# Patient Record
Sex: FEMALE | Race: WHITE | NOT HISPANIC OR LATINO | Employment: UNEMPLOYED | URBAN - METROPOLITAN AREA
[De-identification: names, ages, dates, MRNs, and addresses within clinical notes are randomized per-mention and may not be internally consistent; named-entity substitution may affect disease eponyms.]

---

## 2017-06-16 ENCOUNTER — GENERIC CONVERSION - ENCOUNTER (OUTPATIENT)
Dept: OTHER | Facility: OTHER | Age: 52
End: 2017-06-16

## 2017-06-19 ENCOUNTER — ALLSCRIPTS OFFICE VISIT (OUTPATIENT)
Dept: OTHER | Facility: OTHER | Age: 52
End: 2017-06-19

## 2017-06-19 DIAGNOSIS — N92.1 EXCESSIVE AND FREQUENT MENSTRUATION WITH IRREGULAR CYCLE: ICD-10-CM

## 2017-06-19 PROCEDURE — 88305 TISSUE EXAM BY PATHOLOGIST: CPT | Performed by: OBSTETRICS & GYNECOLOGY

## 2017-06-19 PROCEDURE — 88344 IMHCHEM/IMCYTCHM EA MLT ANTB: CPT | Performed by: OBSTETRICS & GYNECOLOGY

## 2017-06-21 ENCOUNTER — LAB REQUISITION (OUTPATIENT)
Dept: LAB | Facility: HOSPITAL | Age: 52
End: 2017-06-21
Payer: COMMERCIAL

## 2017-06-21 DIAGNOSIS — N84.1 POLYP OF CERVIX UTERI: ICD-10-CM

## 2017-06-23 ENCOUNTER — HOSPITAL ENCOUNTER (OUTPATIENT)
Dept: ULTRASOUND IMAGING | Facility: HOSPITAL | Age: 52
Discharge: HOME | End: 2017-06-23
Attending: OBSTETRICS & GYNECOLOGY
Payer: COMMERCIAL

## 2017-06-23 DIAGNOSIS — N92.1 EXCESSIVE AND FREQUENT MENSTRUATION WITH IRREGULAR CYCLE: ICD-10-CM

## 2017-06-23 PROCEDURE — 76830 TRANSVAGINAL US NON-OB: CPT

## 2017-06-23 PROCEDURE — 76856 US EXAM PELVIC COMPLETE: CPT

## 2017-06-26 ENCOUNTER — GENERIC CONVERSION - ENCOUNTER (OUTPATIENT)
Dept: OTHER | Facility: OTHER | Age: 52
End: 2017-06-26

## 2017-06-27 ENCOUNTER — GENERIC CONVERSION - ENCOUNTER (OUTPATIENT)
Dept: OTHER | Facility: OTHER | Age: 52
End: 2017-06-27

## 2017-09-09 ENCOUNTER — TRANSCRIBE ORDERS (OUTPATIENT)
Dept: ADMINISTRATIVE | Age: 52
End: 2017-09-09

## 2017-09-09 ENCOUNTER — APPOINTMENT (OUTPATIENT)
Dept: LAB | Age: 52
End: 2017-09-09
Attending: PREVENTIVE MEDICINE

## 2017-09-09 DIAGNOSIS — Z13.9 SCREENING FOR CONDITION: ICD-10-CM

## 2017-09-09 DIAGNOSIS — Z13.9 SCREENING FOR CONDITION: Primary | ICD-10-CM

## 2017-09-09 LAB
CHOLEST SERPL-MCNC: 132 MG/DL (ref 50–200)
EST. AVERAGE GLUCOSE BLD GHB EST-MCNC: 117 MG/DL
HBA1C MFR BLD: 5.7 % (ref 4.2–6.3)
HDLC SERPL-MCNC: 70 MG/DL (ref 40–60)
LDLC SERPL CALC-MCNC: 54 MG/DL (ref 0–100)
TRIGL SERPL-MCNC: 40 MG/DL

## 2017-09-09 PROCEDURE — 80061 LIPID PANEL: CPT

## 2017-09-09 PROCEDURE — 83036 HEMOGLOBIN GLYCOSYLATED A1C: CPT

## 2017-09-09 PROCEDURE — 36415 COLL VENOUS BLD VENIPUNCTURE: CPT

## 2018-01-11 NOTE — MISCELLANEOUS
Message   Recorded as Task   Date: 10/17/2016 02:23 PM, Created By: Kelvin Spurling   Task Name: Call Back   Assigned To: Lalo Edgar   Regarding Patient: Floresita Cardona, Status: In Progress   CommentRoegillian Naranjo - 17 Oct 2016 2:23 PM     TASK CREATED  had emb today with joe  now with clotting  [address] Oct 2016 2:38 PM     TASK IN PROGRESS   Stefano Morales - 17 Oct 2016 2:42 PM     TASK EDITED       pt had EB done today was unsure what to expect went over how some bleeding can be normal  to call office if going through pad an hour, pain  advised bleeding can be expected however should taper off  Active Problems    1  Breast lump (611 72) (N63)   2  Cervical polyp (622 7) (N84 1)   3  Encounter for gynecological examination with abnormal finding (V72 31) (Z01 411)   4  Encounter for routine gynecological examination (V72 31) (Z01 419)   5  Encounter for screening mammogram for malignant neoplasm of breast (V76 12)   (Z12 31)   6  Mammogram abnormal (793 80) (R92 8)   7  Menorrhagia with irregular cycle (626 2) (N92 1)   8  Screening for HPV (human papillomavirus) (V73 81) (Z11 51)    Current Meds   1  Citalopram Hydrobromide 20 MG Oral Tablet; Therapy: 80NQU8555 to (Last Rx:17Nov2011)  Requested for: 66YBQ8083 Ordered   2  Citalopram Hydrobromide 40 MG Oral Tablet; Therapy: 24SRS7535 to (Last Rx:26Gfl3123)  Requested for: 48Xww7171 Ordered    Allergies    1   No Known Drug Allergies    Signatures   Electronically signed by : Mickie Fuentes LPN; Oct 17 2419  3:89VT EST                       (Author)

## 2018-01-14 VITALS
DIASTOLIC BLOOD PRESSURE: 76 MMHG | BODY MASS INDEX: 23.07 KG/M2 | SYSTOLIC BLOOD PRESSURE: 122 MMHG | WEIGHT: 147 LBS | HEIGHT: 67 IN

## 2018-01-14 NOTE — MISCELLANEOUS
Message   Recorded as Task   Date: 06/16/2017 09:32 AM, Created By: Nikolay Polo   Task Name: Call Back   Assigned To: Calvin Mcdaniels   Regarding Patient: Anna Purdy, Status: In Progress   Comment:    Jacklyn Allan - 16 Jun 2017 9:32 AM     TASK CREATED  PT CALLED STATING THAT SHE HAS BEEN BLEEDING FOR 2 WKS  [address] 9:32 AM     TASK IN PROGRESS   Sarah Couch - 16 Jun 2017 9:45 AM     TASK EDITED  Spoke with pt - Patient has been bleeding for 2 weeks - 1st week it started normal then got light, like it was going to stop, but it didn't  Week 2 bleeding has been heavy for 3 days now is normal to light flow  Not having any cramping for discomfort just slight bloating  Pt goes through 5-6 tampons a day  Patient had normal periods that last for 5 days - last one prior to this bleeding was 2 1/2 weeks  Is worried  Really wanted an apt - and w/ KTM  Made one for 06/19 in [facility]  Active Problems    1  Breast lump (611 72) (N63)   2  Cervical polyp (622 7) (N84 1)   3  Encounter for gynecological examination with abnormal finding (V72 31) (Z01 411)   4  Encounter for routine gynecological examination (V72 31) (Z01 419)   5  Encounter for screening mammogram for malignant neoplasm of breast (V76 12)   (Z12 31)   6  Mammogram abnormal (793 80) (R92 8)   7  Menorrhagia with irregular cycle (626 2) (N92 1)   8  Screening for HPV (human papillomavirus) (V73 81) (Z11 51)    Current Meds   1  Citalopram Hydrobromide 20 MG Oral Tablet; Therapy: 91GKP2283 to (Last Rx:64Vzi5147)  Requested for: 59SDA4589 Ordered   2  Citalopram Hydrobromide 40 MG Oral Tablet; Therapy: 95VMA8008 to (Last Rx:42Amj3814)  Requested for: 08Abj9005 Ordered    Allergies    1   No Known Drug Allergies    Signatures   Electronically signed by : Debora Villarreal, ; Jun 16 2017  9:45AM EST                       (Author)

## 2018-01-15 NOTE — MISCELLANEOUS
Message   Recorded as Task   Date: 06/23/2017 06:16 PM, Created By: Renetta Lam   Task Name: Go to Result   Assigned To: Elmer Lynne   Regarding Patient: Yong Epperson, Status: In Progress   Alfredtoi Waqas - 23 Jun 2017 6:16 PM     TASK CREATED  please call Al Bravo- her ultrasound looks normal   There is disruption of the lower uterine segment (I had removed a polyp in the office that day, may be just s/p removal)    if no further abnormal bleeding- no further workup needed  If bleeding continues will need D&C     still waiting on pathology    Kash Pruett - 26 Jun 2017 8:23 AM     TASK EDITED  Bouchra Pruett - 26 Jun 2017 8:29 AM     TASK IN PROGRESS   Kash Pruett - 26 Jun 2017 9:02 AM     TASK EDITED  Pt aware  Active Problems    1  Breast lump (611 72) (N63)   2  Cervical polyp (622 7) (N84 1)   3  Encounter for gynecological examination with abnormal finding (V72 31) (Z01 411)   4  Encounter for routine gynecological examination (V72 31) (Z01 419)   5  Encounter for screening mammogram for malignant neoplasm of breast (V76 12)   (Z12 31)   6  Mammogram abnormal (793 80) (R92 8)   7  Menorrhagia with regular cycle (626 2) (N92 0)   8  Screening for HPV (human papillomavirus) (V73 81) (Z11 51)    Current Meds   1  Citalopram Hydrobromide 20 MG Oral Tablet; Therapy: 28MOK4374 to (Last Rx:17Nov2011)  Requested for: 19XLW5882 Ordered    Allergies    1  No Known Drug Allergies    Signatures   Electronically signed by :  Natalia Campos, ; Jun 26 2017  9:02AM EST                       (Author)

## 2018-01-15 NOTE — RESULT NOTES
Message   Recorded as Task   Date: 10/18/2016 07:40 PM, Created By: Mamadou Glass   Task Name: Verify Patient Results   Assigned To: Mari Mirza   Regarding Patient: Yong Epperson, Status: In Progress   CommentVirgia Crow - 18 Oct 2016 7:40 PM        Mari Mirza - 19 Oct 2016 1:51 PM     TASK EDITED  Called patient left a message on her voicemail     Mari Mirza - 19 Oct 2016 1:51 PM     TASK IN PROGRESS   Mari Mirza - 19 Oct 2016 1:57 PM     TASK EDITED  Patient called back and discuss the pathology results for the polyp        Signatures   Electronically signed by : Margaret Mathew CNM; Oct 19 2016  1:57PM EST                       (Author)

## 2018-01-16 NOTE — RESULT NOTES
Message   Recorded as Task   Date: 10/25/2016 03:13 PM, Created By: Norah Ward   Task Name: Verify Patient Results   Assigned To: Mari Mirza   Regarding Patient: Kelin Stokes, Status: In Progress   CommentGladizay Good - 25 Oct 2016 3:13 PM        Mari Mirza - 25 Oct 2016 3:47 PM     TASK EDITED  Called patient left a message on her voicemail to call me back  Mari Mirza - 25 Oct 2016 3:47 PM     TASK IN PROGRESS   Mari Mirza - 26 Oct 2016 11:23 AM     TASK EDITED  Jeffne Jose A called back and informed her that the endometrial biopsy was benign        Signatures   Electronically signed by : Kike Ramirez CNM;  Oct 26 2016 11:24AM EST                       (Author)

## 2018-01-17 NOTE — MISCELLANEOUS
Message   Recorded as Task   Date: 06/27/2017 11:53 AM, Created By: Kelvin Powers   Task Name: Go to Result   Assigned To: Flaco Stapleton   Regarding Patient: Jorden Lynn, Status: In Progress   Mary Hickey - 27 Jun 2017 11:53 AM     TASK CREATED  please call Starr Hubbard- polyp is benign   Rubia Marie - 27 Jun 2017 1:24 PM     TASK IN PROGRESS   Rubia Marie - 27 Jun 2017 1:26 PM     TASK EDITED  lm for pt re benign polyp        Active Problems    1  Breast lump (611 72) (N63)   2  Cervical polyp (622 7) (N84 1)   3  Encounter for gynecological examination with abnormal finding (V72 31) (Z01 411)   4  Encounter for routine gynecological examination (V72 31) (Z01 419)   5  Encounter for screening mammogram for malignant neoplasm of breast (V76 12)   (Z12 31)   6  Mammogram abnormal (793 80) (R92 8)   7  Menorrhagia with regular cycle (626 2) (N92 0)   8  Screening for HPV (human papillomavirus) (V73 81) (Z11 51)    Current Meds   1  Citalopram Hydrobromide 20 MG Oral Tablet; Therapy: 97XAL3281 to (Last Rx:17Nov2011)  Requested for: 40APX5989 Ordered    Allergies    1   No Known Drug Allergies    Signatures   Electronically signed by : Nick Suaer, ; Jun 27 2017  1:26PM EST                       (Author)

## 2018-04-13 PROBLEM — N92.0 MENORRHAGIA WITH REGULAR CYCLE: Status: ACTIVE | Noted: 2017-06-19

## 2018-05-26 ENCOUNTER — TRANSCRIBE ORDERS (OUTPATIENT)
Dept: ADMINISTRATIVE | Age: 53
End: 2018-05-26

## 2018-05-26 ENCOUNTER — APPOINTMENT (OUTPATIENT)
Dept: LAB | Age: 53
End: 2018-05-26
Attending: PREVENTIVE MEDICINE

## 2018-05-26 DIAGNOSIS — Z13.9 SCREENING PROCEDURE: Primary | ICD-10-CM

## 2018-05-26 DIAGNOSIS — Z13.9 SCREENING PROCEDURE: ICD-10-CM

## 2018-05-26 LAB
CHOLEST SERPL-MCNC: 126 MG/DL (ref 50–200)
EST. AVERAGE GLUCOSE BLD GHB EST-MCNC: 114 MG/DL
HBA1C MFR BLD: 5.6 % (ref 4.2–6.3)
HDLC SERPL-MCNC: 72 MG/DL (ref 40–60)
LDLC SERPL CALC-MCNC: 47 MG/DL (ref 0–100)
NONHDLC SERPL-MCNC: 54 MG/DL
TRIGL SERPL-MCNC: 33 MG/DL

## 2018-05-26 PROCEDURE — 83036 HEMOGLOBIN GLYCOSYLATED A1C: CPT

## 2018-05-26 PROCEDURE — 36415 COLL VENOUS BLD VENIPUNCTURE: CPT

## 2018-05-26 PROCEDURE — 80061 LIPID PANEL: CPT

## 2018-10-16 ENCOUNTER — ANNUAL EXAM (OUTPATIENT)
Dept: OBGYN CLINIC | Age: 53
End: 2018-10-16
Payer: COMMERCIAL

## 2018-10-16 VITALS
SYSTOLIC BLOOD PRESSURE: 110 MMHG | WEIGHT: 142 LBS | BODY MASS INDEX: 22.29 KG/M2 | DIASTOLIC BLOOD PRESSURE: 62 MMHG | HEIGHT: 67 IN

## 2018-10-16 DIAGNOSIS — N95.1 PERIMENOPAUSAL: ICD-10-CM

## 2018-10-16 DIAGNOSIS — Z12.31 ENCOUNTER FOR SCREENING MAMMOGRAM FOR MALIGNANT NEOPLASM OF BREAST: ICD-10-CM

## 2018-10-16 DIAGNOSIS — Z01.419 ENCOUNTER FOR GYNECOLOGICAL EXAMINATION WITHOUT ABNORMAL FINDING: Primary | ICD-10-CM

## 2018-10-16 PROCEDURE — G0145 SCR C/V CYTO,THINLAYER,RESCR: HCPCS | Performed by: NURSE PRACTITIONER

## 2018-10-16 PROCEDURE — 87624 HPV HI-RISK TYP POOLED RSLT: CPT | Performed by: NURSE PRACTITIONER

## 2018-10-16 PROCEDURE — S0612 ANNUAL GYNECOLOGICAL EXAMINA: HCPCS | Performed by: NURSE PRACTITIONER

## 2018-10-16 RX ORDER — CITALOPRAM 20 MG/1
20 TABLET ORAL
COMMUNITY
Start: 2018-09-05

## 2018-10-16 NOTE — PATIENT INSTRUCTIONS
Menopause   WHAT YOU NEED TO KNOW:   What is menopause? Menopause is a normal stage in a woman's life when her monthly periods stop  Menopause starts when the ovaries slowly stop making the female hormones estrogen and progesterone  After menopause, a woman is no longer able to become pregnant  A woman who has not had a period for a full year after the age of 39 is considered to be in menopause  Perimenopause is a stage before menopause that may cause signs and symptoms similar to menopause  Perimenopause can last an average of 4 to 5 years  What are the signs and symptoms of menopause? The signs and symptoms of menopause can be different from woman to woman:  · Menstrual period changes such as skipped periods or periods that are closer together, or lighter or heavier than usual    · Hot flashes (feeling warm, flushed, and sweaty)     · Mood changes such as irritability or decreased desire to have sex    · Breast changes such as tenderness or pain    · Hair changes such as thinning hair or increased hair on your face    · Vaginal changes such as increased dryness     · Urinary changes such as increased urinary tract infections (UTIs) or urgency (feeling that you need to urinate right away)    · Other symptoms such as headaches, trouble sleeping, fatigue, or heart palpitations (strong, fast heartbeats)  What do I need to know about menopause? · You can still get pregnant while you have periods  Continue to use birth control if you do not want to get pregnant  You may need to use birth control until it has been 1 year since your periods stopped  Ask your healthcare provider when you can stop using birth control to prevent pregnancy  · Hormone replacement therapy can be used to treat symptoms of menopause  Hormone replacement therapy (HRT) is medicine that replaces your low hormone levels  HRT contains estrogen and sometimes progestin  HRT has benefits and risks   HRT decreases your risk for bone fractures by helping to prevent osteoporosis  HRT also protects you from colon cancer  HRT may increase your risk for breast cancer, blood clots, heart disease, and stroke  Ask your healthcare provider if HRT is right for you  How can I live a healthy lifestyle during and after menopause? After menopause, your risk for heart disease and bone loss increases  Ask about these and other ways to stay healthy:  · Exercise regularly  Exercise helps you maintain a healthy weight  Exercise can also help to control your blood pressure and cholesterol levels  Include weight-bearing exercise for strong bones  Ask your healthcare provider about the best exercise plan for you  · Eat a variety of healthy foods  Include fruits, vegetables, whole grains (whole-wheat bread, pasta, and cereals), low-fat dairy, and lean protein foods (beans, poultry, and fish)  Limit foods high in sodium (salt)  Ask your healthcare provider for more information about a meal plan that is right for you  · Maintain a healthy weight  Check with your healthcare provider before you start any weight loss program      · Take supplements as directed  You may need extra calcium and vitamin D to help prevent osteoporosis  · Limit alcohol and caffeine  Alcohol and caffeine may worsen your symptoms  · Do not smoke  If you smoke, it is never too late to quit  You are more likely to have a heart attack, lung disease, blood clots, and cancer if you smoke  Ask your healthcare provider for information if you need help quitting  When should I contact my healthcare provider? · You have vaginal bleeding after menopause  · You have questions or concerns about your condition or care  CARE AGREEMENT:   You have the right to help plan your care  Learn about your health condition and how it may be treated  Discuss treatment options with your caregivers to decide what care you want to receive  You always have the right to refuse treatment   The above information is an  only  It is not intended as medical advice for individual conditions or treatments  Talk to your doctor, nurse or pharmacist before following any medical regimen to see if it is safe and effective for you  © 2017 [address] [employer] Information is for End User's use only and may not be sold, redistributed or otherwise used for commercial purposes  All illustrations and images included in CareNotes® are the copyrighted property of A D A M [employer] Inc  or Jacobo Calero

## 2018-10-16 NOTE — PROGRESS NOTES
Assessment/Plan:    No problem-specific Assessment & Plan notes found for this encounter  Diagnoses and all orders for this visit:    Encounter for gynecological examination without abnormal finding  -     Liquid-based pap, screening    Perimenopausal  -     US pelvis complete non OB; Future    Encounter for screening mammogram for malignant neoplasm of breast  -     Mammo screening bilateral w 3d & cad; Future    Other orders  -     citalopram (CeleXA) 20 mg tablet;       Call as needed, encouraged calcium/vit D in her diet, all questions answered      Subjective:      Patient ID: Itzel Raza is a 52 y o  female  Pleasant 52 y o  premenopausal female here for annual exam  She denies any issues with menstrual/vaginal bleeding but still has occasional spotting  EMB done 2016 and normal  Pelvic u/s done 2017  + history of abnormal pap smears 2004, last Pap NIL 2015, pap today  Denies vaginal issues  Denies pelvic pain  Colonoscopy UTD  The following portions of the patient's history were reviewed and updated as appropriate:   She  has a past medical history of No known health problems  She   Patient Active Problem List    Diagnosis Date Noted    Perimenopausal 10/16/2018    Encounter for gynecological examination without abnormal finding 10/16/2018    Menorrhagia with regular cycle 06/19/2017     She  has a past surgical history that includes Colposcopy (04/2004)  Her family history includes Hypertension in her father; Skin cancer in her sister  She  reports that she has been smoking  She has never used smokeless tobacco  She reports that she drinks alcohol  She reports that she does not use drugs  Current Outpatient Prescriptions   Medication Sig Dispense Refill    citalopram (CeleXA) 20 mg tablet       rOPINIRole (REQUIP) 0 25 mg tablet Take 1 tablet by mouth daily at bedtime  3     No current facility-administered medications for this visit  She has No Known Allergies    OB History  Para Term  AB Living   1 1 1     1   SAB TAB Ectopic Multiple Live Births           1      # Outcome Date GA Lbr Morgan/2nd Weight Sex Delivery Anes PTL Lv   1 Term                 2 granddaughters, live in [facility] with her daughter  Review of Systems      Objective:      /62 (BP Location: Right arm, Patient Position: Sitting)   Ht 5' 7" (1 702 m)   Wt 64 4 kg (142 lb)   LMP 2018   Breastfeeding? No   BMI 22 24 kg/m²          Physical Exam    Review of Systems   Constitutional: Negative for chills, fatigue, fever and unexpected weight change  Respiratory: Negative for shortness of breath  Gastrointestinal: Negative for anal bleeding, blood in stool, constipation and diarrhea  Genitourinary: Negative for difficulty urinating, dysuria and hematuria  Physical Exam   Constitutional: She appears well-developed and well-nourished  No distress  HENT:   Head: Normocephalic  Neck: Normal range of motion  Neck supple  Pulmonary: Effort normal   Breasts: bilateral without masses, skin changes or nipple discharge  Bilaterally soft and warm to touch  No areas of erythema or pain  Abdominal: Soft  Pelvic exam was performed with patient supine  No labial fusion  There is no rash, tenderness, lesion or injury on the right labia  There is no rash, tenderness, lesion or injury on the left labia  Uterus is not deviated, not enlarged, not fixed and not tender  Cervix exhibits no motion tenderness, no discharge and no friability  Right adnexum displays no mass, no tenderness and no fullness  Left adnexum displays no mass, no tenderness and no fullness  No erythema or tenderness in the vagina  No foreign body in the vagina  No signs of injury around the vagina  No vaginal discharge found  Lymphadenopathy:        Right: No inguinal adenopathy present  Left: No inguinal adenopathy present

## 2018-10-18 LAB
HPV HR 12 DNA CVX QL NAA+PROBE: NEGATIVE
HPV16 DNA CVX QL NAA+PROBE: NEGATIVE
HPV18 DNA CVX QL NAA+PROBE: NEGATIVE

## 2018-10-19 LAB
LAB AP GYN PRIMARY INTERPRETATION: NORMAL
Lab: NORMAL

## 2018-10-24 ENCOUNTER — HOSPITAL ENCOUNTER (OUTPATIENT)
Dept: MAMMOGRAPHY | Facility: HOSPITAL | Age: 53
Discharge: HOME | End: 2018-10-24
Payer: COMMERCIAL

## 2018-10-24 ENCOUNTER — HOSPITAL ENCOUNTER (OUTPATIENT)
Dept: ULTRASOUND IMAGING | Facility: HOSPITAL | Age: 53
Discharge: HOME | End: 2018-10-24
Payer: COMMERCIAL

## 2018-10-24 DIAGNOSIS — Z12.31 ENCOUNTER FOR SCREENING MAMMOGRAM FOR MALIGNANT NEOPLASM OF BREAST: ICD-10-CM

## 2018-10-24 DIAGNOSIS — N95.1 PERIMENOPAUSAL: ICD-10-CM

## 2018-10-24 PROCEDURE — 76830 TRANSVAGINAL US NON-OB: CPT

## 2018-10-24 PROCEDURE — 77067 SCR MAMMO BI INCL CAD: CPT

## 2018-10-24 PROCEDURE — 77063 BREAST TOMOSYNTHESIS BI: CPT

## 2018-10-24 PROCEDURE — 76856 US EXAM PELVIC COMPLETE: CPT

## 2018-10-26 ENCOUNTER — TELEPHONE (OUTPATIENT)
Dept: OBGYN CLINIC | Facility: CLINIC | Age: 53
End: 2018-10-26

## 2018-10-26 NOTE — TELEPHONE ENCOUNTER
Called pt to set up consult with KTM for removal of polyp in uterus  Unfortunately I had to leave a message to call back   cv

## 2018-10-26 NOTE — TELEPHONE ENCOUNTER
Spoke with pt - she is aware of u/s results - that her polyp has gotten slight bigger and that Evelyne recommends she have a consult for it to be removed

## 2018-10-31 ENCOUNTER — OFFICE VISIT (OUTPATIENT)
Dept: OBGYN CLINIC | Age: 53
End: 2018-10-31
Payer: COMMERCIAL

## 2018-10-31 VITALS
BODY MASS INDEX: 23.53 KG/M2 | DIASTOLIC BLOOD PRESSURE: 68 MMHG | WEIGHT: 150.25 LBS | SYSTOLIC BLOOD PRESSURE: 100 MMHG

## 2018-10-31 DIAGNOSIS — N95.0 POSTMENOPAUSAL BLEEDING: ICD-10-CM

## 2018-10-31 DIAGNOSIS — N84.0 ENDOMETRIAL POLYP: Primary | ICD-10-CM

## 2018-10-31 DIAGNOSIS — Z01.818 PREOPERATIVE TESTING: ICD-10-CM

## 2018-10-31 PROBLEM — N92.0 MENORRHAGIA WITH REGULAR CYCLE: Status: RESOLVED | Noted: 2017-06-19 | Resolved: 2018-10-31

## 2018-10-31 PROBLEM — Z72.0 TOBACCO USE: Status: ACTIVE | Noted: 2018-10-31

## 2018-10-31 PROBLEM — N95.1 PERIMENOPAUSAL: Status: RESOLVED | Noted: 2018-10-16 | Resolved: 2018-10-31

## 2018-10-31 PROBLEM — G25.81 RESTLESS LEGS: Status: ACTIVE | Noted: 2018-10-31

## 2018-10-31 PROCEDURE — 99214 OFFICE O/P EST MOD 30 MIN: CPT | Performed by: OBSTETRICS & GYNECOLOGY

## 2018-10-31 RX ORDER — SODIUM CHLORIDE, SODIUM LACTATE, POTASSIUM CHLORIDE, CALCIUM CHLORIDE 600; 310; 30; 20 MG/100ML; MG/100ML; MG/100ML; MG/100ML
125 INJECTION, SOLUTION INTRAVENOUS CONTINUOUS
Status: CANCELLED | OUTPATIENT
Start: 2018-10-31

## 2018-10-31 NOTE — PROGRESS NOTES
Assessment/Plan:    Postmenopausal bleeding  Plan for [facility]  Hysteroscopy with polypectomy with myosure       Diagnoses and all orders for this visit:    Endometrial polyp  -     Case request operating room: DILATATION AND CURETTAGE (D&C) WITH HYSTEROSCOPY  NEED myosure; Standing  -     Case request operating room: DILATATION AND CURETTAGE (D&C) WITH HYSTEROSCOPY  NEED myosure    Postmenopausal bleeding  -     Case request operating room: DILATATION AND CURETTAGE (D&C) WITH HYSTEROSCOPY  NEED myosure; Standing  -     Case request operating room: DILATATION AND CURETTAGE (D&C) WITH HYSTEROSCOPY  NEED myosure    Preoperative testing  -     Type and screen; Future  -     CBC and differential; Future    Other orders  -     Diet NPO; Sips with meds; Standing  -     Height and weight upon arrival; Standing  -     Void on call to OR; Standing  -     Insert peripheral IV; Standing  -     Place sequential compression device; Standing  -     lactated ringers infusion; Infuse 125 mL/hr into a venous catheter continuous           Subjective:      Patient ID: Kem Ruby is a 52 y o  female  Patient is here for consult for polyp removal     52year old  with postmenopausal bleeding with endometrial polyp on ultrasound  Plan for [facility]  hysteroscopy polypectomy    Gynecologic Exam   The patient's primary symptoms include vaginal bleeding  The patient's pertinent negatives include no genital itching, genital lesions, genital odor, genital rash, pelvic pain or vaginal discharge  The current episode started more than 1 year ago  The problem occurs intermittently  The problem has been unchanged  Pertinent negatives include no chills, constipation, diarrhea, fever, nausea, sore throat or vomiting  The vaginal bleeding is spotting  She is sexually active   She is postmenopausal        The following portions of the patient's history were reviewed and updated as appropriate:   She  has a past medical history of No known health problems  She   Patient Active Problem List    Diagnosis Date Noted    Postmenopausal bleeding 10/31/2018    Endometrial polyp 10/31/2018    Tobacco use 10/31/2018    Restless legs 10/31/2018    Encounter for gynecological examination without abnormal finding 10/16/2018     She  has a past surgical history that includes Colposcopy (04/2004)  Her family history includes Hypertension in her father; Skin cancer in her sister  She  reports that she has been smoking  She has never used smokeless tobacco  She reports that she drinks alcohol  She reports that she does not use drugs  Current Outpatient Prescriptions   Medication Sig Dispense Refill    citalopram (CeleXA) 20 mg tablet       rOPINIRole (REQUIP) 0 25 mg tablet Take 1 tablet by mouth daily at bedtime  3     No current facility-administered medications for this visit  Current Outpatient Prescriptions on File Prior to Visit   Medication Sig    citalopram (CeleXA) 20 mg tablet     rOPINIRole (REQUIP) 0 25 mg tablet Take 1 tablet by mouth daily at bedtime     No current facility-administered medications on file prior to visit  She has No Known Allergies       Review of Systems   Constitutional: Negative for activity change, appetite change, chills, fatigue and fever  HENT: Negative for rhinorrhea, sneezing and sore throat  Eyes: Negative for visual disturbance  Respiratory: Negative for cough, shortness of breath and wheezing  Cardiovascular: Negative for chest pain, palpitations and leg swelling  Gastrointestinal: Negative for abdominal distention, constipation, diarrhea, nausea and vomiting  Genitourinary: Negative for difficulty urinating, pelvic pain and vaginal discharge  Neurological: Negative for syncope and light-headedness           Objective:      /68 (BP Location: Left arm, Patient Position: Sitting, Cuff Size: Standard)   Wt 68 2 kg (150 lb 4 oz)   BMI 23 53 kg/m²          Physical Exam   Constitutional: She is oriented to person, place, and time  She appears well-developed and well-nourished  No distress  Cardiovascular: Normal rate, regular rhythm and normal heart sounds  Exam reveals no gallop and no friction rub  No murmur heard  Pulmonary/Chest: Effort normal and breath sounds normal  No respiratory distress  Abdominal: Soft  Bowel sounds are normal  She exhibits no distension  There is no tenderness  There is no rebound and no guarding  Neurological: She is alert and oriented to person, place, and time  She has normal reflexes  Skin: She is not diaphoretic

## 2018-10-31 NOTE — PATIENT INSTRUCTIONS
Dilation and Curettage   WHAT YOU NEED TO KNOW:   What do I need to know about dilation and curettage? Dilation and curettage (D&C) is a procedure to remove tissue from the lining of your uterus  How do I prepare for a D&C? · Your healthcare provider will talk to you about how to prepare for your D&C  He may tell you not to eat or drink anything after midnight on the day of your procedure  He will tell you what medicines to take or not take on the day of your procedure  You may need blood tests before and after your D&C  You may need immune globulin medicine if your blood is Rh-negative, and you had a miscarriage  This medicine helps prevent problems with a future pregnancy  · You may need a vaginal swab test before your D&C to check for infection  Healthcare providers may place medicine in your cervix to help it dilate  This medicine may be inserted 1 to 2 days before your procedure or the day of your procedure  You may be given an antibiotic through your IV to help prevent a bacterial infection  What will happen during a D&C? · You may be given local or epidural anesthesia to numb your procedure area and decrease discomfort  With local or epidural anesthesia, you will be awake during your procedure  Medicine may be given in your IV to help you relax or make you drowsy  You may instead be given general anesthesia to keep you asleep during the procedure  You will lie on your back with your feet in stirrups  A tool called a speculum will be inserted into your vagina to keep it open  Your healthcare provider will check your cervix to see if it is dilated  If needed, he may use tools to dilate your cervix  · Your healthcare provider may use a tool or suction to remove tissue from your uterus  To use suction, your healthcare provider will insert a thin tube into your uterus  The tube is connected to a suction machine or a syringe  The tissue will be removed through the tube   Forceps may be needed to remove larger amounts of tissue  Tissue may be sent to a lab for tests  Medicines may be given to help your uterus tighten, and prevent heavy bleeding  What will happen after a D&C? You will be taken to a recovery room so healthcare providers can watch for heavy bleeding or other problems  You may be able to go home a few hours after your D&C  Have someone drive you home  You may have cramps and spotting or light bleeding for a few days  If you had general anesthesia, have someone stay with you for 24 hours  This is to make sure you do not have a reaction to the anesthesia  It may take a few months for your monthly period to start or become regular  What are the risks of a D&C? · If you are awake during your D&C, you may have pain while the tissue is removed  Your uterus, cervix, intestines, or nearby tissue may be torn or damaged  You may have life-threatening blood loss, and need a blood transfusion or another surgery  You may need to have your uterus removed  You may get an infection in your uterus that could spread to your blood and become life-threatening  You may have an allergic reaction to the anesthesia or antibiotics  · After your D&C, you may have nausea, vomiting, dizziness, or a headache  Scar tissue may form in your uterus  You may need another D&C to remove more tissue from your uterus  If cancer caused your abnormal vaginal bleeding, lab tests may not find the cancer in your uterus  If your D&C was done to end a pregnancy, you have an increased risk for a future miscarriage  You are also at risk for  delivery during a future pregnancy  CARE AGREEMENT:   You have the right to help plan your care  Learn about your health condition and how it may be treated  Discuss treatment options with your caregivers to decide what care you want to receive  You always have the right to refuse treatment  The above information is an  only   It is not intended as medical advice for individual conditions or treatments  Talk to your doctor, nurse or pharmacist before following any medical regimen to see if it is safe and effective for you  © 2017 [employer] Information is for End User's use only and may not be sold, redistributed or otherwise used for commercial purposes  All illustrations and images included in CareNotes® are the copyrighted property of [employer]  or Jacobo Calero

## 2018-11-13 ENCOUNTER — LAB REQUISITION (OUTPATIENT)
Dept: LAB | Facility: HOSPITAL | Age: 53
End: 2018-11-13
Payer: COMMERCIAL

## 2018-11-13 ENCOUNTER — TRANSCRIBE ORDERS (OUTPATIENT)
Dept: LAB | Facility: CLINIC | Age: 53
End: 2018-11-13

## 2018-11-13 ENCOUNTER — APPOINTMENT (OUTPATIENT)
Dept: LAB | Facility: CLINIC | Age: 53
End: 2018-11-13
Payer: COMMERCIAL

## 2018-11-13 DIAGNOSIS — Z01.818 PREOP EXAMINATION: Primary | ICD-10-CM

## 2018-11-13 DIAGNOSIS — Z01.818 ENCOUNTER FOR OTHER PREPROCEDURAL EXAMINATION: ICD-10-CM

## 2018-11-13 DIAGNOSIS — Z01.818 PREOPERATIVE TESTING: ICD-10-CM

## 2018-11-13 DIAGNOSIS — Z01.818 PREOP EXAMINATION: ICD-10-CM

## 2018-11-13 LAB
ABO GROUP BLD: NORMAL
BASOPHILS # BLD AUTO: 0.08 THOUSANDS/ΜL (ref 0–0.1)
BASOPHILS NFR BLD AUTO: 1 % (ref 0–1)
BLD GP AB SCN SERPL QL: NEGATIVE
EOSINOPHIL # BLD AUTO: 0.11 THOUSAND/ΜL (ref 0–0.61)
EOSINOPHIL NFR BLD AUTO: 2 % (ref 0–6)
ERYTHROCYTE [DISTWIDTH] IN BLOOD BY AUTOMATED COUNT: 13 % (ref 11.6–15.1)
HCT VFR BLD AUTO: 45.5 % (ref 34.8–46.1)
HGB BLD-MCNC: 14.4 G/DL (ref 11.5–15.4)
IMM GRANULOCYTES # BLD AUTO: 0.02 THOUSAND/UL (ref 0–0.2)
IMM GRANULOCYTES NFR BLD AUTO: 0 % (ref 0–2)
LYMPHOCYTES # BLD AUTO: 1.78 THOUSANDS/ΜL (ref 0.6–4.47)
LYMPHOCYTES NFR BLD AUTO: 26 % (ref 14–44)
MCH RBC QN AUTO: 28.9 PG (ref 26.8–34.3)
MCHC RBC AUTO-ENTMCNC: 31.6 G/DL (ref 31.4–37.4)
MCV RBC AUTO: 91 FL (ref 82–98)
MONOCYTES # BLD AUTO: 0.6 THOUSAND/ΜL (ref 0.17–1.22)
MONOCYTES NFR BLD AUTO: 9 % (ref 4–12)
NEUTROPHILS # BLD AUTO: 4.35 THOUSANDS/ΜL (ref 1.85–7.62)
NEUTS SEG NFR BLD AUTO: 62 % (ref 43–75)
NRBC BLD AUTO-RTO: 0 /100 WBCS
PLATELET # BLD AUTO: 316 THOUSANDS/UL (ref 149–390)
PMV BLD AUTO: 9.7 FL (ref 8.9–12.7)
RBC # BLD AUTO: 4.98 MILLION/UL (ref 3.81–5.12)
RH BLD: POSITIVE
SPECIMEN EXPIRATION DATE: NORMAL
WBC # BLD AUTO: 6.94 THOUSAND/UL (ref 4.31–10.16)

## 2018-11-13 PROCEDURE — 85025 COMPLETE CBC W/AUTO DIFF WBC: CPT

## 2018-11-13 PROCEDURE — 86901 BLOOD TYPING SEROLOGIC RH(D): CPT | Performed by: OBSTETRICS & GYNECOLOGY

## 2018-11-13 PROCEDURE — 86900 BLOOD TYPING SEROLOGIC ABO: CPT | Performed by: OBSTETRICS & GYNECOLOGY

## 2018-11-13 PROCEDURE — 86850 RBC ANTIBODY SCREEN: CPT | Performed by: OBSTETRICS & GYNECOLOGY

## 2018-11-13 PROCEDURE — 36415 COLL VENOUS BLD VENIPUNCTURE: CPT

## 2018-11-26 ENCOUNTER — APPOINTMENT (OUTPATIENT)
Dept: LAB | Facility: CLINIC | Age: 53
End: 2018-11-26
Payer: COMMERCIAL

## 2018-11-26 DIAGNOSIS — Z01.818 PREOPERATIVE TESTING: ICD-10-CM

## 2018-11-26 DIAGNOSIS — Z01.818 PREOP EXAMINATION: ICD-10-CM

## 2018-11-26 PROCEDURE — 36415 COLL VENOUS BLD VENIPUNCTURE: CPT

## 2018-11-26 PROCEDURE — 86900 BLOOD TYPING SEROLOGIC ABO: CPT

## 2018-11-26 PROCEDURE — 86901 BLOOD TYPING SEROLOGIC RH(D): CPT

## 2018-11-26 PROCEDURE — 86850 RBC ANTIBODY SCREEN: CPT

## 2018-11-27 LAB
ABO GROUP BLD: NORMAL
BLD GP AB SCN SERPL QL: NEGATIVE
RH BLD: POSITIVE
SPECIMEN EXPIRATION DATE: NORMAL

## 2018-12-03 NOTE — PRE-PROCEDURE INSTRUCTIONS
Pre-Surgery Instructions:   Medication Instructions    citalopram (CeleXA) 20 mg tablet Instructed patient per Anesthesia Guidelines   LYSINE PO Instructed patient per Anesthesia Guidelines   MELATONIN PO Instructed patient per Anesthesia Guidelines   rOPINIRole (REQUIP) 0 25 mg tablet Instructed patient per Anesthesia Guidelines  Spoke to pt  Medication list reviewed & instructed  As of 12  3 18 pt to stop lysine  Instructed on tylenol only  Pt takes celexa at hs  Am DOS no meds  Showering instructions given by office, reviewed at time of call  Instructed no smoking 24 hour prior including am DOS  Pt understands  All instructions verbally understood by patient  No further questions  Antidepressant Med Class     Continue to take this medication on your normal schedule  If this is an oral medication and you take it in the morning, then you may take this medicine with a sip of water  Antiparkinsons Med Class     Continue to take this medication on your normal schedule  If this is an oral medication and you take it in the morning, then you may take this medicine with a sip of water  Herbal Med Class     Stop taking this herbal medications at least one week prior to surgery/procedure

## 2018-12-04 NOTE — H&P
Assessment/Plan:     Postmenopausal bleeding  Plan for D&C Hysteroscopy with polypectomy with myosure         Diagnoses and all orders for this visit:     Endometrial polyp  -     Case request operating room: DILATATION AND CURETTAGE (D&C) WITH HYSTEROSCOPY  NEED myosure; Standing  -     Case request operating room: DILATATION AND CURETTAGE (D&C) WITH HYSTEROSCOPY  NEED myosure     Postmenopausal bleeding  -     Case request operating room: DILATATION AND CURETTAGE (D&C) WITH HYSTEROSCOPY  NEED myosure; Standing  -     Case request operating room: DILATATION AND CURETTAGE (D&C) WITH HYSTEROSCOPY  NEED myosure     Preoperative testing  -     Type and screen; Future  -     CBC and differential; Future     Other orders  -     Diet NPO; Sips with meds; Standing  -     Height and weight upon arrival; Standing  -     Void on call to OR; Standing  -     Insert peripheral IV; Standing  -     Place sequential compression device; Standing  -     lactated ringers infusion; Infuse 125 mL/hr into a venous catheter continuous             Subjective:       Patient ID: Cat Krause is a 52 y o  female      Patient is here for consult for polyp removal      52year old  with postmenopausal bleeding with endometrial polyp on ultrasound  Plan for [facility]  hysteroscopy polypectomy  Gynecologic Exam   The patient's primary symptoms include vaginal bleeding  The patient's pertinent negatives include no genital itching, genital lesions, genital odor, genital rash, pelvic pain or vaginal discharge  The current episode started more than 1 year ago  The problem occurs intermittently  The problem has been unchanged  Pertinent negatives include no chills, constipation, diarrhea, fever, nausea, sore throat or vomiting  The vaginal bleeding is spotting  She is sexually active   She is postmenopausal          The following portions of the patient's history were reviewed and updated as appropriate:   She  has a past medical history of No known health problems  She        Patient Active Problem List     Diagnosis Date Noted    Postmenopausal bleeding 10/31/2018    Endometrial polyp 10/31/2018    Tobacco use 10/31/2018    Restless legs 10/31/2018    Encounter for gynecological examination without abnormal finding 10/16/2018      She  has a past surgical history that includes Colposcopy (04/2004)  Her family history includes Hypertension in her father; Skin cancer in her sister  She  reports that she has been smoking  She has never used smokeless tobacco  She reports that she drinks alcohol  She reports that she does not use drugs  Current Outpatient Prescriptions   Medication Sig Dispense Refill    citalopram (CeleXA) 20 mg tablet          rOPINIRole (REQUIP) 0 25 mg tablet Take 1 tablet by mouth daily at bedtime   3      No current facility-administered medications for this visit             Current Outpatient Prescriptions on File Prior to Visit   Medication Sig    citalopram (CeleXA) 20 mg tablet      rOPINIRole (REQUIP) 0 25 mg tablet Take 1 tablet by mouth daily at bedtime      No current facility-administered medications on file prior to visit        She has No Known Allergies        Review of Systems   Constitutional: Negative for activity change, appetite change, chills, fatigue and fever  HENT: Negative for rhinorrhea, sneezing and sore throat  Eyes: Negative for visual disturbance  Respiratory: Negative for cough, shortness of breath and wheezing  Cardiovascular: Negative for chest pain, palpitations and leg swelling  Gastrointestinal: Negative for abdominal distention, constipation, diarrhea, nausea and vomiting  Genitourinary: Negative for difficulty urinating, pelvic pain and vaginal discharge     Neurological: Negative for syncope and light-headedness           Objective:        /68 (BP Location: Left arm, Patient Position: Sitting, Cuff Size: Standard)   Wt 68 2 kg (150 lb 4 oz)   BMI 23 53 kg/m²           Physical Exam   Constitutional: She is oriented to person, place, and time  She appears well-developed and well-nourished  No distress  Cardiovascular: Normal rate, regular rhythm and normal heart sounds  Exam reveals no gallop and no friction rub  No murmur heard  Pulmonary/Chest: Effort normal and breath sounds normal  No respiratory distress  Abdominal: Soft  Bowel sounds are normal  She exhibits no distension  There is no tenderness  There is no rebound and no guarding  Neurological: She is alert and oriented to person, place, and time  She has normal reflexes  Skin: She is not diaphoretic

## 2018-12-07 ENCOUNTER — HOSPITAL ENCOUNTER (OUTPATIENT)
Facility: HOSPITAL | Age: 53
Setting detail: OUTPATIENT SURGERY
Discharge: HOME | End: 2018-12-07
Attending: OBSTETRICS & GYNECOLOGY | Admitting: OBSTETRICS & GYNECOLOGY
Payer: COMMERCIAL

## 2018-12-07 ENCOUNTER — ANESTHESIA (OUTPATIENT)
Dept: PERIOP | Facility: HOSPITAL | Age: 53
End: 2018-12-07
Payer: COMMERCIAL

## 2018-12-07 ENCOUNTER — ANESTHESIA EVENT (OUTPATIENT)
Dept: PERIOP | Facility: HOSPITAL | Age: 53
End: 2018-12-07
Payer: COMMERCIAL

## 2018-12-07 VITALS
DIASTOLIC BLOOD PRESSURE: 71 MMHG | SYSTOLIC BLOOD PRESSURE: 110 MMHG | OXYGEN SATURATION: 100 % | WEIGHT: 147 LBS | HEART RATE: 61 BPM | HEIGHT: 67 IN | RESPIRATION RATE: 16 BRPM | BODY MASS INDEX: 23.07 KG/M2 | TEMPERATURE: 97.1 F

## 2018-12-07 DIAGNOSIS — N95.0 POSTMENOPAUSAL BLEEDING: ICD-10-CM

## 2018-12-07 DIAGNOSIS — G89.18 POSTOPERATIVE PAIN: Primary | ICD-10-CM

## 2018-12-07 DIAGNOSIS — N84.0 ENDOMETRIAL POLYP: ICD-10-CM

## 2018-12-07 PROBLEM — Z98.890 STATUS POST DILATION AND CURETTAGE: Status: ACTIVE | Noted: 2018-12-07

## 2018-12-07 LAB — EXT PREGNANCY TEST URINE: NEGATIVE

## 2018-12-07 PROCEDURE — 88305 TISSUE EXAM BY PATHOLOGIST: CPT | Performed by: PATHOLOGY

## 2018-12-07 PROCEDURE — 81025 URINE PREGNANCY TEST: CPT | Performed by: OBSTETRICS & GYNECOLOGY

## 2018-12-07 PROCEDURE — 58558 HYSTEROSCOPY BIOPSY: CPT | Performed by: OBSTETRICS & GYNECOLOGY

## 2018-12-07 RX ORDER — ACETAMINOPHEN 325 MG/1
650 TABLET ORAL EVERY 6 HOURS PRN
Status: DISCONTINUED | OUTPATIENT
Start: 2018-12-07 | End: 2018-12-07 | Stop reason: HOSPADM

## 2018-12-07 RX ORDER — SODIUM CHLORIDE, SODIUM LACTATE, POTASSIUM CHLORIDE, CALCIUM CHLORIDE 600; 310; 30; 20 MG/100ML; MG/100ML; MG/100ML; MG/100ML
125 INJECTION, SOLUTION INTRAVENOUS CONTINUOUS
Status: DISCONTINUED | OUTPATIENT
Start: 2018-12-07 | End: 2018-12-07 | Stop reason: HOSPADM

## 2018-12-07 RX ORDER — SODIUM CHLORIDE 9 MG/ML
INJECTION, SOLUTION INTRAVENOUS AS NEEDED
Status: DISCONTINUED | OUTPATIENT
Start: 2018-12-07 | End: 2018-12-07 | Stop reason: HOSPADM

## 2018-12-07 RX ORDER — MIDAZOLAM HYDROCHLORIDE 1 MG/ML
INJECTION INTRAMUSCULAR; INTRAVENOUS AS NEEDED
Status: DISCONTINUED | OUTPATIENT
Start: 2018-12-07 | End: 2018-12-07 | Stop reason: SURG

## 2018-12-07 RX ORDER — ONDANSETRON 2 MG/ML
4 INJECTION INTRAMUSCULAR; INTRAVENOUS ONCE AS NEEDED
Status: DISCONTINUED | OUTPATIENT
Start: 2018-12-07 | End: 2018-12-07 | Stop reason: HOSPADM

## 2018-12-07 RX ORDER — IBUPROFEN 600 MG/1
600 TABLET ORAL EVERY 6 HOURS PRN
Qty: 30 TABLET | Refills: 0 | Status: SHIPPED | OUTPATIENT
Start: 2018-12-07

## 2018-12-07 RX ORDER — IBUPROFEN 600 MG/1
600 TABLET ORAL EVERY 6 HOURS PRN
Status: DISCONTINUED | OUTPATIENT
Start: 2018-12-07 | End: 2018-12-07 | Stop reason: HOSPADM

## 2018-12-07 RX ORDER — OXYCODONE HYDROCHLORIDE AND ACETAMINOPHEN 5; 325 MG/1; MG/1
2 TABLET ORAL EVERY 4 HOURS PRN
Status: DISCONTINUED | OUTPATIENT
Start: 2018-12-07 | End: 2018-12-07 | Stop reason: HOSPADM

## 2018-12-07 RX ORDER — KETOROLAC TROMETHAMINE 30 MG/ML
INJECTION, SOLUTION INTRAMUSCULAR; INTRAVENOUS AS NEEDED
Status: DISCONTINUED | OUTPATIENT
Start: 2018-12-07 | End: 2018-12-07 | Stop reason: SURG

## 2018-12-07 RX ORDER — FENTANYL CITRATE 50 UG/ML
INJECTION, SOLUTION INTRAMUSCULAR; INTRAVENOUS AS NEEDED
Status: DISCONTINUED | OUTPATIENT
Start: 2018-12-07 | End: 2018-12-07 | Stop reason: SURG

## 2018-12-07 RX ORDER — OXYCODONE HYDROCHLORIDE AND ACETAMINOPHEN 5; 325 MG/1; MG/1
1 TABLET ORAL EVERY 6 HOURS PRN
Qty: 5 TABLET | Refills: 0 | Status: SHIPPED | OUTPATIENT
Start: 2018-12-07 | End: 2018-12-17

## 2018-12-07 RX ORDER — EPHEDRINE SULFATE 50 MG/ML
INJECTION, SOLUTION INTRAVENOUS AS NEEDED
Status: DISCONTINUED | OUTPATIENT
Start: 2018-12-07 | End: 2018-12-07 | Stop reason: SURG

## 2018-12-07 RX ORDER — OXYCODONE HYDROCHLORIDE AND ACETAMINOPHEN 5; 325 MG/1; MG/1
1 TABLET ORAL EVERY 4 HOURS PRN
Status: DISCONTINUED | OUTPATIENT
Start: 2018-12-07 | End: 2018-12-07 | Stop reason: HOSPADM

## 2018-12-07 RX ORDER — GLYCOPYRROLATE 0.2 MG/ML
INJECTION INTRAMUSCULAR; INTRAVENOUS AS NEEDED
Status: DISCONTINUED | OUTPATIENT
Start: 2018-12-07 | End: 2018-12-07 | Stop reason: SURG

## 2018-12-07 RX ORDER — FENTANYL CITRATE/PF 50 MCG/ML
25 SYRINGE (ML) INJECTION
Status: DISCONTINUED | OUTPATIENT
Start: 2018-12-07 | End: 2018-12-07 | Stop reason: HOSPADM

## 2018-12-07 RX ORDER — LIDOCAINE HYDROCHLORIDE 10 MG/ML
INJECTION, SOLUTION INFILTRATION; PERINEURAL AS NEEDED
Status: DISCONTINUED | OUTPATIENT
Start: 2018-12-07 | End: 2018-12-07 | Stop reason: SURG

## 2018-12-07 RX ORDER — PROPOFOL 10 MG/ML
INJECTION, EMULSION INTRAVENOUS AS NEEDED
Status: DISCONTINUED | OUTPATIENT
Start: 2018-12-07 | End: 2018-12-07 | Stop reason: SURG

## 2018-12-07 RX ORDER — ONDANSETRON 2 MG/ML
INJECTION INTRAMUSCULAR; INTRAVENOUS AS NEEDED
Status: DISCONTINUED | OUTPATIENT
Start: 2018-12-07 | End: 2018-12-07 | Stop reason: SURG

## 2018-12-07 RX ADMIN — EPHEDRINE SULFATE 5 MG: 50 INJECTION, SOLUTION INTRAMUSCULAR; INTRAVENOUS; SUBCUTANEOUS at 08:05

## 2018-12-07 RX ADMIN — ONDANSETRON 4 MG: 2 INJECTION INTRAMUSCULAR; INTRAVENOUS at 07:42

## 2018-12-07 RX ADMIN — FENTANYL CITRATE 50 MCG: 50 INJECTION, SOLUTION INTRAMUSCULAR; INTRAVENOUS at 07:53

## 2018-12-07 RX ADMIN — EPHEDRINE SULFATE 5 MG: 50 INJECTION, SOLUTION INTRAMUSCULAR; INTRAVENOUS; SUBCUTANEOUS at 08:15

## 2018-12-07 RX ADMIN — FENTANYL CITRATE 25 MCG: 50 INJECTION, SOLUTION INTRAMUSCULAR; INTRAVENOUS at 08:34

## 2018-12-07 RX ADMIN — PROPOFOL 200 MG: 10 INJECTION, EMULSION INTRAVENOUS at 07:53

## 2018-12-07 RX ADMIN — KETOROLAC TROMETHAMINE 30 MG: 30 INJECTION, SOLUTION INTRAMUSCULAR at 08:29

## 2018-12-07 RX ADMIN — GLYCOPYRROLATE 0.1 MG: 0.2 INJECTION, SOLUTION INTRAMUSCULAR; INTRAVENOUS at 07:42

## 2018-12-07 RX ADMIN — FENTANYL CITRATE 25 MCG: 50 INJECTION, SOLUTION INTRAMUSCULAR; INTRAVENOUS at 08:25

## 2018-12-07 RX ADMIN — EPHEDRINE SULFATE 5 MG: 50 INJECTION, SOLUTION INTRAMUSCULAR; INTRAVENOUS; SUBCUTANEOUS at 08:10

## 2018-12-07 RX ADMIN — MIDAZOLAM 2 MG: 1 INJECTION INTRAMUSCULAR; INTRAVENOUS at 07:42

## 2018-12-07 RX ADMIN — SODIUM CHLORIDE, SODIUM LACTATE, POTASSIUM CHLORIDE, AND CALCIUM CHLORIDE: .6; .31; .03; .02 INJECTION, SOLUTION INTRAVENOUS at 07:33

## 2018-12-07 RX ADMIN — LIDOCAINE HYDROCHLORIDE 100 MG: 10 INJECTION, SOLUTION INFILTRATION; PERINEURAL at 07:53

## 2018-12-07 NOTE — OP NOTE
OPERATIVE REPORT  PATIENT NAME: Ziyad Hammonds    :  1965  MRN: [id number]  Pt Location: [facility] OR ROOM 07    SURGERY DATE: 2018    Surgeon(s) and Role:     * Kayleigh Preciado MD - Primary     * Delma Chester MD - Assisting    Preop Diagnosis:  Postmenopausal bleeding [N95 0]  Endometrial polyp [N84 0]    Post-Op Diagnosis Codes:     * Postmenopausal bleeding [N95 0]     * Endometrial polyp [N84 0]    Procedure(s) (LRB):  DILATATION AND CURETTAGE (D&C) WITH HYSTEROSCOPY  NEED myosure (N/A)  POLYPECTOMY (N/A)    Specimen(s):  ID Type Source Tests Collected by Time Destination   1 :  Tissue Endometrium TISSUE EXAM Kayleigh Preciado MD 2018 [id number]    2 : uterine polyp Tissue Polyp, Cervical/Endometrial TISSUE EXAM Kayleigh Preciado MD 2018 [id number]        Estimated Blood Loss: 10 mL    Anesthesia Type: General    Operative Indications:  Postmenopausal bleeding [N95 0]  Endometrial polyp [N84 0]    Operative Findings:  - Endometrial polyp on posterior uterine wall, about 3-4mm  - Smooth fundus  - Atrophic endometrium   - Fluid deficit: [id number]    Complications: None    Procedure and Technique:  Patient was identified in the holding area and brought back to the operating room where general anesthesia was initiated  Bilateral lower extremity compression boots were placed prior to initiation of anesthesia  Patient was placed in the dorsal lithotomy position and prepped and draped in a sterile fashion  Timeout procedure was completed  A weighted speculum and humble were placed into the vagina for visualization of the cervix  A single toothed tenaculum was used to grasp the anterior lip of the cervix  The uterus was sounded and the cervix was dilated to 17 using a Howard dilator to accommodate the insertion of the hysteroscope  Using the Aquilex fluid management system according to product instructions, the device was primed prior to use      The hysteroscope was inserted and using normal saline as the distension fluid the endometrium was visualized  An approximately 3-4mm polyp was identified on the posterior uterine wall  The Myosure was inserted through the hysteroscope and used to remove the polyp under direct visualization  The entire polyp was removed and there was no active bleeding  The entire uterine cavity was inspected on withdrawal of the scope  Good hemostasis was noted  The hysteroscope was removed and the specimen was sent to pathology for evaluation  All instruments were removed from the vagina and good hemostasis was noted on the anterior cervix  The patient was extubated and brought the the recovery room in stable condition  All sponge, lap and needle counts were correct       Patient Disposition: PACU     SIGNATURE: Brie Cervantes MD  DATE: December 7, 2018  TIME: 8:44 AM

## 2018-12-07 NOTE — ANESTHESIA PREPROCEDURE EVALUATION
Review of Systems/Medical History  Patient summary reviewed  Chart reviewed      Cardiovascular   Pulmonary       GI/Hepatic            Endo/Other     GYN       Hematology   Musculoskeletal       Neurology   Psychology           Physical Exam    Airway    Mallampati score: I  TM Distance: >3 FB  Neck ROM: full     Dental       Cardiovascular      Pulmonary      Other Findings        Anesthesia Plan  ASA Score- 2     Anesthesia Type- general with ASA Monitors  Additional Monitors:   Airway Plan: LMA  Plan Factors-    Induction- intravenous  Postoperative Plan-     Informed Consent- Anesthetic plan and risks discussed with patient  I personally reviewed this patient with the CRNA  Discussed and agreed on the Anesthesia Plan with the CRNA  Jose Johnson

## 2018-12-07 NOTE — ANESTHESIA POSTPROCEDURE EVALUATION
Post-Op Assessment Note      CV Status:  Stable    Post-procedure mental status: Sleepy, yet easily arousable  Hydration Status:  Euvolemic    PONV Controlled:  None    Airway Patency:  Patent    Post Op Vitals Reviewed: Yes          Staff: CRNA       Comments: Pt  to Pacu  Report given  Course uneventful  VSS  Airway patent            /63 (12/07/18 0841)    Temp (P) 98 5 °F (36 9 °C) (12/07/18 0840)    Pulse 67 (12/07/18 0841)   Resp 16 (12/07/18 0841)    SpO2 98 % (12/07/18 0841)

## 2018-12-07 NOTE — DISCHARGE INSTRUCTIONS
Dilation and Curettage   WHAT YOU NEED TO KNOW:   Dilation and curettage (D&C) is a procedure to remove tissue from the lining of your uterus  DISCHARGE INSTRUCTIONS:   Call 911 for any of the following:   · You have signs of an allergic reaction, such as hives, trouble breathing, or severe swelling  Seek care immediately if:   · You have heavy vaginal bleeding that soaks 1 pad in 1 hour for 2 hours in a row  · You have a fever higher than 100 4°F (38°C)  · You have abdominal cramps for more than 2 days  · Your pain does not get better, even after treatment  Contact your healthcare provider if:   · You have foul-smelling vaginal discharge  · You do not get your monthly period  · You feel depressed or anxious  · You feel very tired and weak  · You have questions or concerns about your condition or care  Medicines: You may need any of the following:  · Prescription pain medicine  may be given  Do not wait until the pain is severe before you take your medicine  Ask your healthcare provider how to take this medicine safely  · Antibiotics  help fight or prevent a bacterial infection  · Take your medicine as directed  Contact your healthcare provider if you think your medicine is not helping or if you have side effects  Tell him or her if you are allergic to any medicine  Keep a list of the medicines, vitamins, and herbs you take  Include the amounts, and when and why you take them  Bring the list or the pill bottles to follow-up visits  Carry your medicine list with you in case of an emergency  Self-care:   · Use sanitary pads if needed  You may have light bleeding for up to 2 weeks  Do not use tampons  Use sanitary pads instead  This will help prevent a vaginal infection  · Rest as needed  Slowly start to do more each day  Return to your daily activities as directed  · Do not have sex for at least 2 weeks after the procedure    This will help prevent an infection  · Use birth control right after your procedure  Your monthly period should start again in 4 to 8 weeks  During this time, you could still ovulate (release an egg)  Use birth control as directed to prevent pregnancy during this time  Follow up with your healthcare provider as directed:  Write down your questions so you remember to ask them during your visits  © 2017 [employer]  Information is for End User's use only and may not be sold, redistributed or otherwise used for commercial purposes  All illustrations and images included in CareNotes® are the copyrighted property of [employer]  or Jacobo Calero  The above information is an  only  It is not intended as medical advice for individual conditions or treatments  Talk to your doctor, nurse or pharmacist before following any medical regimen to see if it is safe and effective for you

## 2019-04-23 ENCOUNTER — TRANSCRIBE ORDERS (OUTPATIENT)
Dept: ADMINISTRATIVE | Facility: HOSPITAL | Age: 54
End: 2019-04-23

## 2019-04-23 DIAGNOSIS — E01.0 THYROMEGALY: Primary | ICD-10-CM

## 2019-04-30 ENCOUNTER — HOSPITAL ENCOUNTER (OUTPATIENT)
Dept: ULTRASOUND IMAGING | Facility: HOSPITAL | Age: 54
Discharge: HOME | End: 2019-04-30
Attending: FAMILY MEDICINE
Payer: COMMERCIAL

## 2019-04-30 DIAGNOSIS — E01.0 THYROMEGALY: ICD-10-CM

## 2019-04-30 PROCEDURE — 76536 US EXAM OF HEAD AND NECK: CPT

## 2019-10-28 ENCOUNTER — ANNUAL EXAM (OUTPATIENT)
Dept: OBGYN CLINIC | Facility: MEDICAL CENTER | Age: 54
End: 2019-10-28
Payer: COMMERCIAL

## 2019-10-28 VITALS — SYSTOLIC BLOOD PRESSURE: 126 MMHG | WEIGHT: 155.6 LBS | DIASTOLIC BLOOD PRESSURE: 84 MMHG | BODY MASS INDEX: 24.37 KG/M2

## 2019-10-28 DIAGNOSIS — Z12.39 SCREENING FOR MALIGNANT NEOPLASM OF BREAST: ICD-10-CM

## 2019-10-28 DIAGNOSIS — Z01.419 ENCOUNTER FOR GYNECOLOGICAL EXAMINATION WITHOUT ABNORMAL FINDING: Primary | ICD-10-CM

## 2019-10-28 PROBLEM — E05.90 HYPERTHYROIDISM: Status: ACTIVE | Noted: 2019-05-21

## 2019-10-28 PROBLEM — N84.0 ENDOMETRIAL POLYP: Status: RESOLVED | Noted: 2018-10-31 | Resolved: 2019-10-28

## 2019-10-28 PROCEDURE — S0612 ANNUAL GYNECOLOGICAL EXAMINA: HCPCS | Performed by: STUDENT IN AN ORGANIZED HEALTH CARE EDUCATION/TRAINING PROGRAM

## 2019-10-28 RX ORDER — SUMATRIPTAN 100 MG/1
TABLET, FILM COATED ORAL
COMMUNITY
Start: 2019-09-02

## 2019-10-28 RX ORDER — METHIMAZOLE 5 MG/1
5 TABLET ORAL DAILY
COMMUNITY
Start: 2019-09-27

## 2019-10-28 NOTE — ASSESSMENT & PLAN NOTE
61 yo  postmenopausal patient here for annual visit    10/18 Neg NILM pap and HPV  Mammo 10/18 BIRADS 1, slip given  Colonoscopy negative 2 years ago, due at 5 years   Reviewed healthy diet and exercise as well as Ca and vit D supplement  No postmenopausal bleeding  Sexually active without issues

## 2019-10-28 NOTE — PROGRESS NOTES
Assessment/Plan:    Encounter for gynecological examination without abnormal finding  63 yo  postmenopausal patient here for annual visit    10/18 Neg NILM pap and HPV  Mammo 10/18 BIRADS 1, slip given  Colonoscopy negative 2 years ago, due at 5 years   Reviewed healthy diet and exercise as well as Ca and vit D supplement  No postmenopausal bleeding  Sexually active without issues         Diagnoses and all orders for this visit:    Encounter for gynecological examination without abnormal finding    Screening for malignant neoplasm of breast  -     Mammo screening bilateral w cad; Future    Other orders  -     methimazole (TAPAZOLE) 5 mg tablet; Take 5 mg by mouth daily  -     SUMAtriptan (IMITREX) 100 mg tablet          Subjective:      Patient ID: Radha Ponce is a 62 y o  female  63 yo postmenopausal patient here for annual exam  Doing well  No issues  No bleeding since D&C polypectomy with Dr Gordo Nina in December of last year (path neg)  She has been dealing with new diagnosis of Graves disease and trying to get her thyroid levels balanced  She is just doing medication right now (no surgery or ablation)  She is seeing a rheumatologist for joint pain tomorrow given the association with arthritis and Graves  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  Eats a healthy diet  Not yet taking Ca and Vit D  She is sexually active without complaints  The following portions of the patient's history were reviewed and updated as appropriate: allergies, current medications, past family history, past medical history, past social history, past surgical history and problem list     Review of Systems   Constitutional: Negative for chills and fever  Respiratory: Negative for shortness of breath  Cardiovascular: Negative for chest pain  Gastrointestinal: Positive for constipation (fluctuating likely due to thryoid irregularities) and diarrhea  Negative for abdominal pain and nausea  Genitourinary: Negative for dysuria, frequency, urgency, vaginal bleeding and vaginal discharge  Musculoskeletal: Positive for arthralgias  Objective:      /84 (BP Location: Left arm, Patient Position: Sitting, Cuff Size: Standard)   Wt 70 6 kg (155 lb 9 6 oz)   LMP 02/01/2018   BMI 24 37 kg/m²          Physical Exam   Constitutional: She appears well-developed and well-nourished  HENT:   Head: Normocephalic and atraumatic  Eyes: EOM are normal    Neck: Normal range of motion  No thyromegaly present  Pulmonary/Chest: Effort normal  Right breast exhibits no inverted nipple, no mass, no nipple discharge, no skin change and no tenderness  Left breast exhibits no inverted nipple, no mass, no nipple discharge, no skin change and no tenderness  Breasts are symmetrical    Abdominal: Soft  She exhibits no distension and no mass  There is no tenderness  There is no rebound and no guarding  Genitourinary: Pelvic exam was performed with patient supine  There is no rash, tenderness, lesion or injury on the right labia  There is no rash, tenderness, lesion or injury on the left labia  Uterus is not enlarged and not tender  Cervix exhibits no motion tenderness, no discharge and no friability  Right adnexum displays no mass, no tenderness and no fullness  Left adnexum displays no mass, no tenderness and no fullness  No erythema, tenderness or bleeding in the vagina  No signs of injury around the vagina  No vaginal discharge found  Genitourinary Comments: Vagina with atrophy consistent with postmenopausal status  Urethral meatus normal without lesion, discharge  No grossly appreciated pelvic organ prolapse  Small anteverted uterus

## 2019-11-21 ENCOUNTER — HOSPITAL ENCOUNTER (OUTPATIENT)
Dept: RADIOLOGY | Facility: HOSPITAL | Age: 54
Discharge: HOME | End: 2019-11-21
Payer: COMMERCIAL

## 2019-11-21 ENCOUNTER — TRANSCRIBE ORDERS (OUTPATIENT)
Dept: ADMINISTRATIVE | Facility: HOSPITAL | Age: 54
End: 2019-11-21

## 2019-11-21 VITALS — BODY MASS INDEX: 23.54 KG/M2 | HEIGHT: 67 IN | WEIGHT: 150 LBS

## 2019-11-21 DIAGNOSIS — Z12.39 SCREENING FOR MALIGNANT NEOPLASM OF BREAST: ICD-10-CM

## 2019-11-21 PROCEDURE — 77067 SCR MAMMO BI INCL CAD: CPT

## 2019-11-21 PROCEDURE — 77063 BREAST TOMOSYNTHESIS BI: CPT

## 2019-11-22 ENCOUNTER — TELEPHONE (OUTPATIENT)
Dept: OBGYN CLINIC | Facility: MEDICAL CENTER | Age: 54
End: 2019-11-22

## 2019-11-22 NOTE — TELEPHONE ENCOUNTER
Called patient to review negative mammogram  No answer, left VM for her to call back  Due again in one year for screening

## 2019-11-22 NOTE — TELEPHONE ENCOUNTER
Pt contacted # [phone]- received vm- per hippa communication consent on file- lmom advising as directed

## 2020-02-03 ENCOUNTER — TRANSCRIBE ORDERS (OUTPATIENT)
Dept: ADMINISTRATIVE | Facility: HOSPITAL | Age: 55
End: 2020-02-03

## 2020-02-03 DIAGNOSIS — E04.0 SIMPLE GOITER: Primary | ICD-10-CM

## 2020-02-07 ENCOUNTER — HOSPITAL ENCOUNTER (OUTPATIENT)
Dept: ULTRASOUND IMAGING | Facility: HOSPITAL | Age: 55
Discharge: HOME | End: 2020-02-07
Payer: COMMERCIAL

## 2020-02-07 DIAGNOSIS — E04.0 SIMPLE GOITER: ICD-10-CM

## 2020-02-07 PROCEDURE — 76536 US EXAM OF HEAD AND NECK: CPT

## 2020-08-30 ENCOUNTER — APPOINTMENT (OUTPATIENT)
Dept: URGENT CARE | Age: 55
End: 2020-08-30
Attending: PREVENTIVE MEDICINE

## 2020-08-30 ENCOUNTER — TRANSCRIBE ORDERS (OUTPATIENT)
Dept: ADMINISTRATIVE | Age: 55
End: 2020-08-30

## 2020-08-30 DIAGNOSIS — Z13.9 ENCOUNTER FOR SCREENING: ICD-10-CM

## 2020-08-30 DIAGNOSIS — Z13.9 ENCOUNTER FOR SCREENING: Primary | ICD-10-CM

## 2020-08-30 LAB
CHOLEST SERPL-MCNC: 174 MG/DL (ref 50–200)
EST. AVERAGE GLUCOSE BLD GHB EST-MCNC: 117 MG/DL
HBA1C MFR BLD: 5.7 %
HDLC SERPL-MCNC: 85 MG/DL
LDLC SERPL CALC-MCNC: 81 MG/DL (ref 0–100)
NONHDLC SERPL-MCNC: 89 MG/DL
TRIGL SERPL-MCNC: 41 MG/DL

## 2020-08-30 PROCEDURE — 83036 HEMOGLOBIN GLYCOSYLATED A1C: CPT

## 2020-08-30 PROCEDURE — 80061 LIPID PANEL: CPT

## 2020-11-01 ENCOUNTER — TELEPHONE (OUTPATIENT)
Dept: OTHER | Facility: OTHER | Age: 55
End: 2020-11-01

## 2021-01-11 ENCOUNTER — ANNUAL EXAM (OUTPATIENT)
Dept: OBGYN CLINIC | Facility: MEDICAL CENTER | Age: 56
End: 2021-01-11
Payer: COMMERCIAL

## 2021-01-11 VITALS
DIASTOLIC BLOOD PRESSURE: 78 MMHG | BODY MASS INDEX: 26.39 KG/M2 | HEIGHT: 66 IN | WEIGHT: 164.2 LBS | SYSTOLIC BLOOD PRESSURE: 132 MMHG

## 2021-01-11 DIAGNOSIS — Z01.419 ENCOUNTER FOR GYNECOLOGICAL EXAMINATION (GENERAL) (ROUTINE) WITHOUT ABNORMAL FINDINGS: ICD-10-CM

## 2021-01-11 DIAGNOSIS — Z12.31 ENCOUNTER FOR SCREENING MAMMOGRAM FOR MALIGNANT NEOPLASM OF BREAST: Primary | ICD-10-CM

## 2021-01-11 DIAGNOSIS — Z01.419 ENCOUNTER FOR GYNECOLOGICAL EXAMINATION WITHOUT ABNORMAL FINDING: ICD-10-CM

## 2021-01-11 PROBLEM — M81.0 OSTEOPOROSIS: Status: ACTIVE | Noted: 2020-09-28

## 2021-01-11 PROCEDURE — S0612 ANNUAL GYNECOLOGICAL EXAMINA: HCPCS | Performed by: STUDENT IN AN ORGANIZED HEALTH CARE EDUCATION/TRAINING PROGRAM

## 2021-01-11 RX ORDER — CHOLECALCIFEROL (VITAMIN D3) 125 MCG
5000 CAPSULE ORAL DAILY
COMMUNITY

## 2021-01-11 NOTE — PROGRESS NOTES
Assessment/Plan:    Encounter for gynecological examination without abnormal finding  61 yo postmenopausal patient here for annual exam    Pap 10/18 NILM, HPV neg  Mammo slip given  Reviewed breast self awareness  Colonoscopy 5/16  5 year recall  DEXA with osteoporosis managed by Rheum  Reviewed healthy diet and exercise, ca and vit D  Sexually active without issues  Diagnoses and all orders for this visit:    Encounter for screening mammogram for malignant neoplasm of breast  -     Mammo screening bilateral w 3d & cad; Future    Encounter for gynecological examination (general) (routine) without abnormal findings    Encounter for gynecological examination without abnormal finding    Other orders  -     denosumab (PROLIA) 60 mg/mL; INJECT 60MG SUBCUTANEOUSLY  EVERY 6 MONTHS (GIVEN AT  PRESCRIBERS OFFICE)          Subjective:      Patient ID: Jeni Alba is a 60 y o  female  61 yo postmenopausal patient here for annual exam  She has no postmenopausal bleeding, pelvic pain, vulvar problems or breast concerns  She is sexually active without problems  She had a busy year health wise with graves and lyme disease and is finally off all medications and feeling like herself in the last couple of months  The following portions of the patient's history were reviewed and updated as appropriate: allergies, current medications, past family history, past medical history, past social history, past surgical history and problem list     Review of Systems   Constitutional: Negative for chills and fever  HENT: Negative for ear pain and sore throat  Eyes: Negative for pain and visual disturbance  Respiratory: Negative for cough and shortness of breath  Cardiovascular: Negative for chest pain and palpitations  Gastrointestinal: Negative for abdominal pain, constipation, diarrhea, nausea and vomiting     Genitourinary: Negative for dyspareunia, dysuria, frequency, hematuria, urgency, vaginal bleeding, vaginal discharge and vaginal pain  Musculoskeletal: Negative for arthralgias and back pain  Skin: Negative for color change and rash  Neurological: Negative for seizures and syncope  All other systems reviewed and are negative  Objective:      /78 (BP Location: Left arm, Patient Position: Sitting, Cuff Size: Standard)   Ht 5' 6" (1 676 m)   Wt 74 5 kg (164 lb 3 2 oz)   LMP 02/01/2018   BMI 26 50 kg/m²          Physical Exam  Constitutional:       Appearance: She is well-developed  HENT:      Head: Normocephalic and atraumatic  Neck:      Musculoskeletal: Normal range of motion  Thyroid: No thyromegaly  Pulmonary:      Effort: Pulmonary effort is normal    Chest:      Breasts: Breasts are symmetrical          Right: No inverted nipple, mass, nipple discharge, skin change or tenderness  Left: No inverted nipple, mass, nipple discharge, skin change or tenderness  Abdominal:      General: There is no distension  Palpations: Abdomen is soft  There is no mass  Tenderness: There is no abdominal tenderness  There is no guarding or rebound  Genitourinary:     Exam position: Supine  Labia:         Right: No rash, tenderness, lesion or injury  Left: No rash, tenderness, lesion or injury  Vagina: No signs of injury  No vaginal discharge, erythema, tenderness or bleeding  Cervix: No cervical motion tenderness, discharge or friability  Uterus: Not enlarged and not tender  Adnexa:         Right: No mass, tenderness or fullness  Left: No mass, tenderness or fullness  Comments: Vagina with atrophy consistent with postmenopausal status  Urethral meatus normal without lesion, discharge  No grossly appreciated pelvic organ prolapse  Rectovaginal exam without nodularity or masses  Lymphadenopathy:      Upper Body:      Right upper body: No supraclavicular, axillary or pectoral adenopathy        Left upper body: No supraclavicular, axillary or pectoral adenopathy

## 2021-01-11 NOTE — ASSESSMENT & PLAN NOTE
59 yo postmenopausal patient here for annual exam    Pap 10/18 NILM, HPV neg  Mammo slip given  Reviewed breast self awareness  Colonoscopy 5/16  5 year recall  DEXA with osteoporosis managed by Rheum  Reviewed healthy diet and exercise, ca and vit D  Sexually active without issues

## 2021-01-19 ENCOUNTER — HOSPITAL ENCOUNTER (OUTPATIENT)
Dept: RADIOLOGY | Facility: HOSPITAL | Age: 56
Discharge: HOME | End: 2021-01-19
Payer: COMMERCIAL

## 2021-01-19 VITALS — BODY MASS INDEX: 25.71 KG/M2 | HEIGHT: 66 IN | WEIGHT: 160 LBS

## 2021-01-19 DIAGNOSIS — Z12.31 ENCOUNTER FOR SCREENING MAMMOGRAM FOR MALIGNANT NEOPLASM OF BREAST: ICD-10-CM

## 2021-01-19 PROCEDURE — 77063 BREAST TOMOSYNTHESIS BI: CPT

## 2021-01-19 PROCEDURE — 77067 SCR MAMMO BI INCL CAD: CPT

## 2021-01-20 ENCOUNTER — TELEPHONE (OUTPATIENT)
Dept: OBGYN CLINIC | Facility: MEDICAL CENTER | Age: 56
End: 2021-01-20

## 2021-05-16 ENCOUNTER — TRANSCRIBE ORDERS (OUTPATIENT)
Dept: URGENT CARE | Facility: CLINIC | Age: 56
End: 2021-05-16

## 2021-05-16 ENCOUNTER — APPOINTMENT (OUTPATIENT)
Dept: URGENT CARE | Facility: CLINIC | Age: 56
End: 2021-05-16

## 2021-05-16 DIAGNOSIS — Z13.9 SCREENING FOR UNSPECIFIED CONDITION: Primary | ICD-10-CM

## 2021-05-16 DIAGNOSIS — Z13.9 SCREENING FOR UNSPECIFIED CONDITION: ICD-10-CM

## 2021-05-16 LAB
CHOLEST SERPL-MCNC: 159 MG/DL (ref 50–200)
EST. AVERAGE GLUCOSE BLD GHB EST-MCNC: 111 MG/DL
HBA1C MFR BLD: 5.5 %
HDLC SERPL-MCNC: 70 MG/DL
LDLC SERPL CALC-MCNC: 81 MG/DL (ref 0–100)
NONHDLC SERPL-MCNC: 89 MG/DL
TRIGL SERPL-MCNC: 40 MG/DL

## 2021-05-16 PROCEDURE — 80061 LIPID PANEL: CPT

## 2021-05-16 PROCEDURE — 83036 HEMOGLOBIN GLYCOSYLATED A1C: CPT

## 2021-09-23 ENCOUNTER — APPOINTMENT (EMERGENCY)
Dept: CT IMAGING | Facility: HOSPITAL | Age: 56
End: 2021-09-23
Payer: COMMERCIAL

## 2021-09-23 ENCOUNTER — HOSPITAL ENCOUNTER (EMERGENCY)
Facility: HOSPITAL | Age: 56
Discharge: HOME | End: 2021-09-24
Attending: EMERGENCY MEDICINE | Admitting: EMERGENCY MEDICINE
Payer: COMMERCIAL

## 2021-09-23 VITALS
TEMPERATURE: 98.2 F | SYSTOLIC BLOOD PRESSURE: 116 MMHG | HEART RATE: 78 BPM | RESPIRATION RATE: 16 BRPM | DIASTOLIC BLOOD PRESSURE: 73 MMHG | OXYGEN SATURATION: 98 %

## 2021-09-23 DIAGNOSIS — K59.00 CONSTIPATION: Primary | ICD-10-CM

## 2021-09-23 DIAGNOSIS — N39.0 UTI (URINARY TRACT INFECTION): ICD-10-CM

## 2021-09-23 LAB
ALBUMIN SERPL BCP-MCNC: 4 G/DL (ref 3.5–5)
ALP SERPL-CCNC: 70 U/L (ref 46–116)
ALT SERPL W P-5'-P-CCNC: 25 U/L (ref 12–78)
ANION GAP SERPL CALCULATED.3IONS-SCNC: 7 MMOL/L (ref 4–13)
AST SERPL W P-5'-P-CCNC: 20 U/L (ref 5–45)
BACTERIA UR QL AUTO: ABNORMAL /HPF
BASOPHILS # BLD AUTO: 0.08 THOUSANDS/ΜL (ref 0–0.1)
BASOPHILS NFR BLD AUTO: 1 % (ref 0–1)
BILIRUB SERPL-MCNC: 0.31 MG/DL (ref 0.2–1)
BILIRUB UR QL STRIP: NEGATIVE
BUN SERPL-MCNC: 20 MG/DL (ref 5–25)
CALCIUM SERPL-MCNC: 9 MG/DL (ref 8.3–10.1)
CHLORIDE SERPL-SCNC: 103 MMOL/L (ref 100–108)
CLARITY UR: CLEAR
CO2 SERPL-SCNC: 31 MMOL/L (ref 21–32)
COLOR UR: ABNORMAL
CREAT SERPL-MCNC: 1.21 MG/DL (ref 0.6–1.3)
EOSINOPHIL # BLD AUTO: 0.13 THOUSAND/ΜL (ref 0–0.61)
EOSINOPHIL NFR BLD AUTO: 2 % (ref 0–6)
ERYTHROCYTE [DISTWIDTH] IN BLOOD BY AUTOMATED COUNT: 12.7 % (ref 11.6–15.1)
GFR SERPL CREATININE-BSD FRML MDRD: 49 ML/MIN/1.73SQ M
GLUCOSE SERPL-MCNC: 92 MG/DL (ref 65–140)
GLUCOSE UR STRIP-MCNC: NEGATIVE MG/DL
HCT VFR BLD AUTO: 45.6 % (ref 34.8–46.1)
HGB BLD-MCNC: 14.4 G/DL (ref 11.5–15.4)
HGB UR QL STRIP.AUTO: NEGATIVE
IMM GRANULOCYTES # BLD AUTO: 0.01 THOUSAND/UL (ref 0–0.2)
IMM GRANULOCYTES NFR BLD AUTO: 0 % (ref 0–2)
KETONES UR STRIP-MCNC: NEGATIVE MG/DL
LEUKOCYTE ESTERASE UR QL STRIP: NEGATIVE
LIPASE SERPL-CCNC: 177 U/L (ref 73–393)
LYMPHOCYTES # BLD AUTO: 2.4 THOUSANDS/ΜL (ref 0.6–4.47)
LYMPHOCYTES NFR BLD AUTO: 32 % (ref 14–44)
MCH RBC QN AUTO: 28.9 PG (ref 26.8–34.3)
MCHC RBC AUTO-ENTMCNC: 31.6 G/DL (ref 31.4–37.4)
MCV RBC AUTO: 91 FL (ref 82–98)
MONOCYTES # BLD AUTO: 0.67 THOUSAND/ΜL (ref 0.17–1.22)
MONOCYTES NFR BLD AUTO: 9 % (ref 4–12)
NEUTROPHILS # BLD AUTO: 4.12 THOUSANDS/ΜL (ref 1.85–7.62)
NEUTS SEG NFR BLD AUTO: 56 % (ref 43–75)
NITRITE UR QL STRIP: POSITIVE
NON-SQ EPI CELLS URNS QL MICRO: ABNORMAL /HPF
NRBC BLD AUTO-RTO: 0 /100 WBCS
PH UR STRIP.AUTO: 7 [PH]
PLATELET # BLD AUTO: 341 THOUSANDS/UL (ref 149–390)
PMV BLD AUTO: 9.8 FL (ref 8.9–12.7)
POTASSIUM SERPL-SCNC: 4.1 MMOL/L (ref 3.5–5.3)
PROT SERPL-MCNC: 7.4 G/DL (ref 6.4–8.2)
PROT UR STRIP-MCNC: NEGATIVE MG/DL
RBC # BLD AUTO: 4.99 MILLION/UL (ref 3.81–5.12)
RBC #/AREA URNS AUTO: ABNORMAL /HPF
SODIUM SERPL-SCNC: 141 MMOL/L (ref 136–145)
SP GR UR STRIP.AUTO: <=1.005 (ref 1–1.03)
UROBILINOGEN UR QL STRIP.AUTO: 0.2 E.U./DL
WBC # BLD AUTO: 7.41 THOUSAND/UL (ref 4.31–10.16)
WBC #/AREA URNS AUTO: ABNORMAL /HPF

## 2021-09-23 PROCEDURE — 87186 SC STD MICRODIL/AGAR DIL: CPT | Performed by: EMERGENCY MEDICINE

## 2021-09-23 PROCEDURE — 74177 CT ABD & PELVIS W/CONTRAST: CPT

## 2021-09-23 PROCEDURE — 81001 URINALYSIS AUTO W/SCOPE: CPT

## 2021-09-23 PROCEDURE — 36415 COLL VENOUS BLD VENIPUNCTURE: CPT

## 2021-09-23 PROCEDURE — 87086 URINE CULTURE/COLONY COUNT: CPT | Performed by: EMERGENCY MEDICINE

## 2021-09-23 PROCEDURE — 87077 CULTURE AEROBIC IDENTIFY: CPT | Performed by: EMERGENCY MEDICINE

## 2021-09-23 PROCEDURE — 83690 ASSAY OF LIPASE: CPT | Performed by: EMERGENCY MEDICINE

## 2021-09-23 PROCEDURE — 80053 COMPREHEN METABOLIC PANEL: CPT | Performed by: EMERGENCY MEDICINE

## 2021-09-23 PROCEDURE — 99284 EMERGENCY DEPT VISIT MOD MDM: CPT

## 2021-09-23 PROCEDURE — 99284 EMERGENCY DEPT VISIT MOD MDM: CPT | Performed by: EMERGENCY MEDICINE

## 2021-09-23 PROCEDURE — G1004 CDSM NDSC: HCPCS

## 2021-09-23 PROCEDURE — 96360 HYDRATION IV INFUSION INIT: CPT

## 2021-09-23 PROCEDURE — 85025 COMPLETE CBC W/AUTO DIFF WBC: CPT | Performed by: EMERGENCY MEDICINE

## 2021-09-23 RX ORDER — CEPHALEXIN 500 MG/1
500 CAPSULE ORAL EVERY 12 HOURS SCHEDULED
Qty: 14 CAPSULE | Refills: 0 | Status: SHIPPED | OUTPATIENT
Start: 2021-09-23 | End: 2021-09-30

## 2021-09-23 RX ORDER — DOCUSATE SODIUM 100 MG/1
100 CAPSULE, LIQUID FILLED ORAL EVERY 12 HOURS
Qty: 14 CAPSULE | Refills: 0 | Status: SHIPPED | OUTPATIENT
Start: 2021-09-23 | End: 2021-09-30

## 2021-09-23 RX ORDER — POLYETHYLENE GLYCOL 3350 17 G/17G
17 POWDER, FOR SOLUTION ORAL DAILY
Qty: 119 G | Refills: 0 | Status: SHIPPED | OUTPATIENT
Start: 2021-09-23 | End: 2021-09-30

## 2021-09-23 RX ORDER — CEPHALEXIN 250 MG/1
500 CAPSULE ORAL ONCE
Status: DISCONTINUED | OUTPATIENT
Start: 2021-09-23 | End: 2021-09-24 | Stop reason: HOSPADM

## 2021-09-23 RX ADMIN — IOHEXOL 100 ML: 350 INJECTION, SOLUTION INTRAVENOUS at 21:09

## 2021-09-23 RX ADMIN — SODIUM CHLORIDE 1000 ML: 0.9 INJECTION, SOLUTION INTRAVENOUS at 20:29

## 2021-09-24 NOTE — ED NOTES
Pt discharge instructions reviewed by provider, Pt has no further questions at this time  Pt awake and alert, no signs of acute distress noted  Pt ambulated out of ED with a steady gait       Karmen Saleh RN  09/24/21 3713

## 2021-09-24 NOTE — ED ATTENDING ATTESTATION
9/23/2021  IColin DO, saw and evaluated the patient  I have discussed the patient with the resident/non-physician practitioner and agree with the resident's/non-physician practitioner's findings, Plan of Care, and MDM as documented in the resident's/non-physician practitioner's note, except where noted  All available labs and Radiology studies were reviewed  I was present for key portions of any procedure(s) performed by the resident/non-physician practitioner and I was immediately available to provide assistance  At this point I agree with the current assessment done in the Emergency Department  I have conducted an independent evaluation of this patient a history and physical is as follows:    58-year-old female with 2 day history of constipation, generalized abdominal discomfort, mostly in the lower abdomen, suprapubic and left lower quadrant  Denies any fevers, chills, nausea, vomiting, diarrhea  She has history of constipation which happened several years ago while she was traveling abroad  Pain feels similar today  On physical exam: pt very well appearing, in NAD  mucous membranes moist   CTA b/l , heart RRR  Abdomen is mildly tender to suprapubic and LLQ regions, ND, no R/R/G  Normal bowel sounds  Neuro intact, gcs 15  Cap refill < 2 sec, skin warm and dry  ED Course     No acute abnormalities on CT or labs  + bacteriuria  Treat for UTI and constipation      Critical Care Time  Procedures

## 2021-09-24 NOTE — DISCHARGE INSTRUCTIONS
Please take the full 7 day course of keflex (antibiotic) for your UTI  Please take the Miralax and Colace as needed for constipation

## 2021-09-24 NOTE — ED PROVIDER NOTES
History  Chief Complaint   Patient presents with    Abdominal Pain     x 2 days, progessively getting worse  Pt denies any n/v/d  Pt denies chest pain, shortness of breath or dizziness  49-year-old female presents to the ED with abdominal pain  Patient states the pain started 2 days ago, she noticed in the epigastric region and thought it was indigestion  Patient states the discomfort, pain worsened in the coming days she felt more bloated and distended  Today the patient states the pain is in the suprapubic region  Patient states pain is worsened with movement  The patient took a laxative today and did not have any improvement  She denies fever, chills, nausea, vomiting, diarrhea, hematuria, dysuria, hematochezia, sick contacts, any recent trauma  History provided by:  Patient  Abdominal Pain  Pain location:  Epigastric and suprapubic  Pain quality: fullness    Pain radiates to:  Does not radiate  Pain severity:  Mild  Onset quality:  Sudden  Duration:  2 days  Timing:  Constant  Progression:  Worsening  Context: not alcohol use, not diet changes, not recent travel, not sick contacts, not suspicious food intake and not trauma    Worsened by: Movement  Associated symptoms: constipation    Associated symptoms: no chest pain, no chills, no cough, no dysuria, no fatigue, no fever, no hematochezia, no hematuria, no nausea, no shortness of breath, no sore throat and no vomiting        Prior to Admission Medications   Prescriptions Last Dose Informant Patient Reported? Taking?    Calcium Carbonate-Vitamin D (CALCIUM-D PO)  Self Yes No   Sig: Take by mouth   Cholecalciferol (Vitamin D3 Ultra Strength) 125 MCG (5000 UT) capsule  Self Yes No   Sig: Take 5,000 Units by mouth daily   LYSINE PO  Self Yes No   Sig: Take by mouth   MELATONIN PO  Self Yes No   Sig: Take by mouth   SUMAtriptan (IMITREX) 100 mg tablet  Self Yes No   citalopram (CeleXA) 20 mg tablet  Self Yes No   Sig: Take 20 mg by mouth daily at bedtime     denosumab (PROLIA) 60 mg/mL  Self Yes No   Sig: INJECT 60MG SUBCUTANEOUSLY  EVERY 6 MONTHS (GIVEN AT  PRESCRIBERS OFFICE)   ibuprofen (MOTRIN) 600 mg tablet  Self No No   Sig: Take 1 tablet (600 mg total) by mouth every 6 (six) hours as needed for mild pain   methimazole (TAPAZOLE) 5 mg tablet  Self Yes No   Sig: Take 5 mg by mouth daily   rOPINIRole (REQUIP) 0 25 mg tablet  Self Yes No   Sig: Take 1 tablet by mouth daily at bedtime      Facility-Administered Medications: None       Past Medical History:   Diagnosis Date    Anxiety     Endometrial polyp 10/31/2018    Restless leg syndrome        Past Surgical History:   Procedure Laterality Date    BACK SURGERY      COLPOSCOPY  04/2004    POLYPECTOMY N/A 12/7/2018    Procedure: POLYPECTOMY;  Surgeon: Kang Dowell MD;  Location: [facility];  Service: Gynecology    DC HYSTEROSCOPY,W/ENDO BX N/A 12/7/2018    Procedure: DILATATION AND CURETTAGE (D&C) WITH HYSTEROSCOPY  NEED myosure;  Surgeon: Kang Dowell MD;  Location: [facility];  Service: Gynecology       Family History   Problem Relation Age of Onset    Hypertension Father     No Known Problems Sister     Thyroid cancer Daughter     Cancer Maternal Grandmother     Cervical cancer Paternal Grandmother     No Known Problems Sister     No Known Problems Maternal Aunt     No Known Problems Paternal Aunt     Breast cancer Neg Hx     Colon cancer Neg Hx     Ovarian cancer Neg Hx     Uterine cancer Neg Hx      I have reviewed and agree with the history as documented  E-Cigarette/Vaping     E-Cigarette/Vaping Substances     Social History     Tobacco Use    Smoking status: Former Smoker     Packs/day: 0 50     Quit date: 2018     Years since quitting: 3 7    Smokeless tobacco: Never Used   Substance Use Topics    Alcohol use: No    Drug use: No          Review of Systems   Constitutional: Negative for chills, fatigue and fever     HENT: Negative for ear pain and sore throat  Eyes: Negative for pain and visual disturbance  Respiratory: Negative for cough and shortness of breath  Cardiovascular: Negative for chest pain and palpitations  Gastrointestinal: Positive for abdominal pain and constipation  Negative for hematochezia, nausea and vomiting  Genitourinary: Negative for dysuria and hematuria  Musculoskeletal: Negative for arthralgias and back pain  Skin: Negative for color change and rash  Neurological: Negative for seizures and syncope  All other systems reviewed and are negative  Physical Exam  ED Triage Vitals [09/23/21 1818]   Temperature Pulse Respirations Blood Pressure SpO2   98 2 °F (36 8 °C) 78 16 116/73 98 %      Temp Source Heart Rate Source Patient Position - Orthostatic VS BP Location FiO2 (%)   Oral Monitor Sitting Left arm --      Pain Score       --             Orthostatic Vital Signs  Vitals:    09/23/21 1818   BP: 116/73   Pulse: 78   Patient Position - Orthostatic VS: Sitting       Physical Exam  Vitals and nursing note reviewed  Constitutional:       General: She is not in acute distress  Appearance: She is well-developed  She is not ill-appearing  HENT:      Head: Normocephalic and atraumatic  Eyes:      Conjunctiva/sclera: Conjunctivae normal    Cardiovascular:      Rate and Rhythm: Normal rate and regular rhythm  Heart sounds: No murmur heard  Pulmonary:      Effort: Pulmonary effort is normal  No respiratory distress  Breath sounds: Normal breath sounds  Abdominal:      General: There is no distension  Palpations: Abdomen is soft  Tenderness: There is abdominal tenderness in the epigastric area and suprapubic area  There is no guarding or rebound  Musculoskeletal:      Cervical back: Neck supple  Skin:     General: Skin is warm and dry  Neurological:      Mental Status: She is alert and oriented to person, place, and time           ED Medications  Medications   cephalexin (KEFLEX) capsule 500 mg (has no administration in time range)   sodium chloride 0 9 % bolus 1,000 mL (0 mL Intravenous Stopped 9/23/21 2129)   iohexol (OMNIPAQUE) 350 MG/ML injection (SINGLE-DOSE) 100 mL (100 mL Intravenous Given 9/23/21 2109)       Diagnostic Studies  Results Reviewed     Procedure Component Value Units Date/Time    Urine culture [[id number]]     Lab Status: No result Specimen: Urine, Clean Catch     Urine Microscopic [[id number]]  (Abnormal) Collected: 09/23/21 2228    Lab Status: Final result Specimen: Urine, Clean Catch Updated: 09/23/21 2320     RBC, UA 0-1 /hpf      WBC, UA 1-2 /hpf      Epithelial Cells None Seen /hpf      Bacteria, UA Innumerable /hpf     UA w Reflex to Microscopic w Reflex to Culture [[id number]]  (Abnormal) Collected: 09/23/21 2228    Lab Status: Final result Specimen: Urine, Clean Catch Updated: 09/23/21 2243     Color, UA Light Yellow     Clarity, UA Clear     Specific Gravity, UA <=1 005     pH, UA 7 0     Leukocytes, UA Negative     Nitrite, UA Positive     Protein, UA Negative mg/dl      Glucose, UA Negative mg/dl      Ketones, UA Negative mg/dl      Urobilinogen, UA 0 2 E U /dl      Bilirubin, UA Negative     Blood, UA Negative    Comprehensive metabolic panel [[id number]] Collected: 09/23/21 1821    Lab Status: Final result Specimen: Blood from Arm, Right Updated: 09/23/21 1956     Sodium 141 mmol/L      Potassium 4 1 mmol/L      Chloride 103 mmol/L      CO2 31 mmol/L      ANION GAP 7 mmol/L      BUN 20 mg/dL      Creatinine 1 21 mg/dL      Glucose 92 mg/dL      Calcium 9 0 mg/dL      AST 20 U/L      ALT 25 U/L      Alkaline Phosphatase 70 U/L      Total Protein 7 4 g/dL      Albumin 4 0 g/dL      Total Bilirubin 0 31 mg/dL      eGFR 49 ml/min/1 73sq m     Narrative:      [place] guidelines for Chronic Kidney Disease (CKD):     Stage 1 with normal or high GFR (GFR > 90 mL/min/1 73 square meters)    Stage 2 Mild CKD (GFR = 60-89 mL/min/1 73 square meters)   Stage 3A Moderate CKD (GFR = 45-59 mL/min/1 73 square meters)    Stage 3B Moderate CKD (GFR = 30-44 mL/min/1 73 square meters)    Stage 4 Severe CKD (GFR = 15-29 mL/min/1 73 square meters)    Stage 5 End Stage CKD (GFR <15 mL/min/1 73 square meters)  Note: GFR calculation is accurate only with a steady state creatinine    Lipase [[id number]]  (Normal) Collected: 09/23/21 1821    Lab Status: Final result Specimen: Blood from Arm, Right Updated: 09/23/21 1943     Lipase 177 u/L     CBC and differential [[id number]] Collected: 09/23/21 1821    Lab Status: Final result Specimen: Blood from Arm, Right Updated: 09/23/21 1856     WBC 7 41 Thousand/uL      RBC 4 99 Million/uL      Hemoglobin 14 4 g/dL      Hematocrit 45 6 %      MCV 91 fL      MCH 28 9 pg      MCHC 31 6 g/dL      RDW 12 7 %      MPV 9 8 fL      Platelets 826 Thousands/uL      nRBC 0 /100 WBCs      Neutrophils Relative 56 %      Immat GRANS % 0 %      Lymphocytes Relative 32 %      Monocytes Relative 9 %      Eosinophils Relative 2 %      Basophils Relative 1 %      Neutrophils Absolute 4 12 Thousands/µL      Immature Grans Absolute 0 01 Thousand/uL      Lymphocytes Absolute 2 40 Thousands/µL      Monocytes Absolute 0 67 Thousand/µL      Eosinophils Absolute 0 13 Thousand/µL      Basophils Absolute 0 08 Thousands/µL                  CT abdomen pelvis with contrast   Final Result by Christa Bill MD (09/23 2127)      No acute pathology  Workstation performed: [id number]               Procedures  Procedures      ED Course                             SBIRT 22yo+      Most Recent Value   SBIRT (24 yo +)   In order to provide better care to our patients, we are screening all of our patients for alcohol and drug use  Would it be okay to ask you these screening questions?   Unable to answer at this time Filed at: 09/23/2021 1948                MDM  Number of Diagnoses or Management Options  Constipation: new and does not require workup  UTI (urinary tract infection): new and does not require workup  Diagnosis management comments: Patient had a CT abdomen scan that demonstrated no acute pathology  Urinalysis demonstrated innumerable bacteria, positive nitrites, 1-2 WBC  She was given keflex on discharge for UTI as well as miralax and colace for constipation  Amount and/or Complexity of Data Reviewed  Clinical lab tests: ordered and reviewed  Tests in the radiology section of CPT®: ordered and reviewed  Review and summarize past medical records: yes    Patient Progress  Patient progress: stable      Disposition  Final diagnoses:   Constipation   UTI (urinary tract infection)     Time reflects when diagnosis was documented in both MDM as applicable and the Disposition within this note     Time User Action Codes Description Comment    9/23/2021 11:23 PM Paco Langley [K59 00] Constipation     9/23/2021 11:23 PM Johnathan Borrero [N39 0] UTI (urinary tract infection)       ED Disposition     ED Disposition Condition Date/Time Comment    Discharge Stable Thu Sep 23, 2021 11:24 PM Marianne Cates discharge to home/self care              Follow-up Information     Follow up With Specialties Details Why Contact Info Additional Information    Rosanna Loyd,  Family Medicine In 2 days  [address] [facility] [address]       [address] Emergency Department Emergency Medicine  As needed, If symptoms worsen [address] [facility] [id number] [place] [address] Emergency Department, [address], [place], [place], [address]          Discharge Medication List as of 9/23/2021 11:27 PM      START taking these medications    Details   cephalexin (KEFLEX) 500 mg capsule Take 1 capsule (500 mg total) by mouth every 12 (twelve) hours for 7 days, Starting Thu 9/23/2021, Until Thu 9/30/2021, Normal      docusate sodium (COLACE) 100 mg capsule Take 1 capsule (100 mg total) by mouth every 12 (twelve) hours for 7 days, Starting Thu 9/23/2021, Until Thu 9/30/2021, Normal      polyethylene glycol (MIRALAX) 17 g packet Take 17 g by mouth daily for 7 days, Starting Thu 9/23/2021, Until Thu 9/30/2021, Normal         CONTINUE these medications which have NOT CHANGED    Details   Calcium Carbonate-Vitamin D (CALCIUM-D PO) Take by mouth, Historical Med      Cholecalciferol (Vitamin D3 Ultra Strength) 125 MCG (5000 UT) capsule Take 5,000 Units by mouth daily, Historical Med      citalopram (CeleXA) 20 mg tablet Take 20 mg by mouth daily at bedtime  , Starting Wed 9/5/2018, Historical Med      denosumab (PROLIA) 60 mg/mL INJECT 60MG SUBCUTANEOUSLY  EVERY 6 MONTHS (GIVEN AT  PRESCRIBERS OFFICE), Historical Med      ibuprofen (MOTRIN) 600 mg tablet Take 1 tablet (600 mg total) by mouth every 6 (six) hours as needed for mild pain, Starting Fri 12/7/2018, Normal      LYSINE PO Take by mouth, Historical Med      MELATONIN PO Take by mouth, Historical Med      methimazole (TAPAZOLE) 5 mg tablet Take 5 mg by mouth daily, Starting Fri 9/27/2019, Historical Med      rOPINIRole (REQUIP) 0 25 mg tablet Take 1 tablet by mouth daily at bedtime, Starting Wed 3/21/2018, Historical Med      SUMAtriptan (IMITREX) 100 mg tablet Starting Mon 9/2/2019, Historical Med           No discharge procedures on file  PDMP Review     None           ED Provider  Attending physically available and evaluated Daly Bhandari  B[facility] managed the patient along with the ED Attending      Electronically Signed by         Aliza Lewis MD  09/23/21 Christian Quinones MD  09/24/21 [id number]

## 2021-09-26 LAB — BACTERIA UR CULT: ABNORMAL

## 2022-02-11 ENCOUNTER — ANNUAL EXAM (OUTPATIENT)
Dept: OBGYN CLINIC | Facility: MEDICAL CENTER | Age: 57
End: 2022-02-11
Payer: COMMERCIAL

## 2022-02-11 VITALS
DIASTOLIC BLOOD PRESSURE: 82 MMHG | WEIGHT: 166.4 LBS | HEIGHT: 67 IN | SYSTOLIC BLOOD PRESSURE: 120 MMHG | BODY MASS INDEX: 26.12 KG/M2

## 2022-02-11 DIAGNOSIS — Z01.419 ENCOUNTER FOR GYNECOLOGICAL EXAMINATION WITHOUT ABNORMAL FINDING: Primary | ICD-10-CM

## 2022-02-11 DIAGNOSIS — Z12.31 ENCOUNTER FOR SCREENING MAMMOGRAM FOR MALIGNANT NEOPLASM OF BREAST: ICD-10-CM

## 2022-02-11 PROCEDURE — G0145 SCR C/V CYTO,THINLAYER,RESCR: HCPCS | Performed by: STUDENT IN AN ORGANIZED HEALTH CARE EDUCATION/TRAINING PROGRAM

## 2022-02-11 PROCEDURE — G0476 HPV COMBO ASSAY CA SCREEN: HCPCS | Performed by: STUDENT IN AN ORGANIZED HEALTH CARE EDUCATION/TRAINING PROGRAM

## 2022-02-11 PROCEDURE — S0612 ANNUAL GYNECOLOGICAL EXAMINA: HCPCS | Performed by: STUDENT IN AN ORGANIZED HEALTH CARE EDUCATION/TRAINING PROGRAM

## 2022-02-11 RX ORDER — VALACYCLOVIR HYDROCHLORIDE 1 G/1
TABLET, FILM COATED ORAL
COMMUNITY
Start: 2022-01-12

## 2022-02-11 NOTE — PROGRESS NOTES
Assessment/Plan:    Encounter for gynecological examination without abnormal finding  61 yo here for annual exam    Pap 10/18 NILM, HPV negative  Remote abnormal/excision  Cotest collected today will continue q3 until 72 (abnormal prior to 40)  Mammo slip given  Reviewed breast self awareness  Colonoscopy due and she has doc she wants to follow with  Osteoporosis managed by Rheum  Healthy diet and exercise  Sexually active without issues  Diagnoses and all orders for this visit:    Encounter for screening mammogram for malignant neoplasm of breast  -     Mammo screening bilateral w 3d & cad; Future    Encounter for gynecological examination without abnormal finding    Other orders  -     valACYclovir (VALTREX) 1,000 mg tablet          Subjective:      Patient ID: Malika Stokes is a 60 y o  female  61 yo here for annual exam  She has no bleeding, vulvar or breast concerns  No pelvic pain  She is sexually active without problems  Her 79 yo dad was just diagnosed with metastatic prostate cancer so life has been very hectic  Trying to go day by day  Waiting to reschedule a cruise and [place] trip  The following portions of the patient's history were reviewed and updated as appropriate: allergies, current medications, past family history, past medical history, past social history, past surgical history and problem list     Review of Systems   Constitutional: Negative for chills and fever  HENT: Negative for ear pain and sore throat  Eyes: Negative for pain and visual disturbance  Respiratory: Negative for cough and shortness of breath  Cardiovascular: Negative for chest pain and palpitations  Gastrointestinal: Negative for abdominal pain, constipation, diarrhea, nausea and vomiting  Genitourinary: Negative for dyspareunia, dysuria, frequency, hematuria, pelvic pain, urgency, vaginal bleeding, vaginal discharge and vaginal pain  Musculoskeletal: Negative for arthralgias and back pain  Skin: Negative for color change and rash  Neurological: Negative for seizures and syncope  All other systems reviewed and are negative  Objective:      /82   Ht 5' 7" (1 702 m)   Wt 75 5 kg (166 lb 6 4 oz)   LMP 02/01/2018   BMI 26 06 kg/m²          Physical Exam  Constitutional:       Appearance: She is well-developed  HENT:      Head: Normocephalic and atraumatic  Neck:      Thyroid: No thyromegaly  Pulmonary:      Effort: Pulmonary effort is normal    Chest:   Breasts: Breasts are symmetrical       Right: No inverted nipple, mass, nipple discharge, skin change or tenderness  Left: No inverted nipple, mass, nipple discharge, skin change or tenderness  Abdominal:      General: There is no distension  Palpations: Abdomen is soft  There is no mass  Tenderness: There is no abdominal tenderness  There is no guarding or rebound  Genitourinary:     Exam position: Supine  Labia:         Right: No rash, tenderness, lesion or injury  Left: No rash, tenderness, lesion or injury  Vagina: No signs of injury  No vaginal discharge, erythema, tenderness or bleeding  Cervix: No cervical motion tenderness, discharge or friability  Uterus: Not enlarged and not tender  Adnexa:         Right: No mass, tenderness or fullness  Left: No mass, tenderness or fullness  Comments: Vagina with atrophy consistent with postmenopausal status  Urethral meatus normal without lesion, discharge  No grossly appreciated pelvic organ prolapse  Rectovaginal exam without nodularity or masses  Musculoskeletal:      Cervical back: Normal range of motion

## 2022-02-11 NOTE — ASSESSMENT & PLAN NOTE
61 yo here for annual exam    Pap 10/18 NILM, HPV negative  Remote abnormal/excision  Cotest collected today will continue q3 until 72 (abnormal prior to 40)  Mammo slip given  Reviewed breast self awareness  Colonoscopy due and she has doc she wants to follow with  Osteoporosis managed by Rheum  Healthy diet and exercise  Sexually active without issues

## 2022-02-18 LAB
LAB AP GYN PRIMARY INTERPRETATION: NORMAL
Lab: NORMAL

## 2022-03-14 ENCOUNTER — HOSPITAL ENCOUNTER (OUTPATIENT)
Dept: RADIOLOGY | Facility: HOSPITAL | Age: 57
Discharge: HOME | End: 2022-03-14
Payer: COMMERCIAL

## 2022-03-14 VITALS — WEIGHT: 165 LBS | BODY MASS INDEX: 25.9 KG/M2 | HEIGHT: 67 IN

## 2022-03-14 DIAGNOSIS — Z12.31 ENCOUNTER FOR SCREENING MAMMOGRAM FOR MALIGNANT NEOPLASM OF BREAST: ICD-10-CM

## 2022-03-14 PROCEDURE — 77063 BREAST TOMOSYNTHESIS BI: CPT

## 2022-03-14 PROCEDURE — 77067 SCR MAMMO BI INCL CAD: CPT

## 2022-07-12 ENCOUNTER — TRANSCRIBE ORDERS (OUTPATIENT)
Dept: URGENT CARE | Facility: CLINIC | Age: 57
End: 2022-07-12

## 2022-07-12 ENCOUNTER — APPOINTMENT (OUTPATIENT)
Dept: URGENT CARE | Facility: CLINIC | Age: 57
End: 2022-07-12

## 2022-07-12 DIAGNOSIS — Z00.8 ENCOUNTER FOR BIOMETRIC SCREENING: ICD-10-CM

## 2022-07-12 DIAGNOSIS — Z00.8 ENCOUNTER FOR BIOMETRIC SCREENING: Primary | ICD-10-CM

## 2022-07-12 LAB
CHOLEST SERPL-MCNC: 162 MG/DL
EST. AVERAGE GLUCOSE BLD GHB EST-MCNC: 108 MG/DL
HBA1C MFR BLD: 5.4 %
HDLC SERPL-MCNC: 71 MG/DL
LDLC SERPL CALC-MCNC: 82 MG/DL (ref 0–100)
NONHDLC SERPL-MCNC: 91 MG/DL
TRIGL SERPL-MCNC: 45 MG/DL

## 2022-07-12 PROCEDURE — 80061 LIPID PANEL: CPT

## 2022-07-12 PROCEDURE — 83036 HEMOGLOBIN GLYCOSYLATED A1C: CPT

## 2022-11-18 ENCOUNTER — ANESTHESIA (OUTPATIENT)
Dept: GASTROENTEROLOGY | Facility: AMBULARY SURGERY CENTER | Age: 57
End: 2022-11-18

## 2022-11-18 ENCOUNTER — HOSPITAL ENCOUNTER (OUTPATIENT)
Dept: GASTROENTEROLOGY | Facility: AMBULARY SURGERY CENTER | Age: 57
Setting detail: OUTPATIENT SURGERY
Discharge: HOME | End: 2022-11-18
Attending: COLON & RECTAL SURGERY

## 2022-11-18 ENCOUNTER — ANESTHESIA EVENT (OUTPATIENT)
Dept: GASTROENTEROLOGY | Facility: AMBULARY SURGERY CENTER | Age: 57
End: 2022-11-18

## 2022-11-18 VITALS
HEART RATE: 63 BPM | BODY MASS INDEX: 26.03 KG/M2 | RESPIRATION RATE: 19 BRPM | OXYGEN SATURATION: 99 % | WEIGHT: 162 LBS | HEIGHT: 66 IN | SYSTOLIC BLOOD PRESSURE: 109 MMHG | DIASTOLIC BLOOD PRESSURE: 76 MMHG | TEMPERATURE: 97.3 F

## 2022-11-18 DIAGNOSIS — Z86.010 PERSONAL HISTORY OF COLONIC POLYPS: ICD-10-CM

## 2022-11-18 RX ORDER — SODIUM CHLORIDE, SODIUM LACTATE, POTASSIUM CHLORIDE, CALCIUM CHLORIDE 600; 310; 30; 20 MG/100ML; MG/100ML; MG/100ML; MG/100ML
INJECTION, SOLUTION INTRAVENOUS CONTINUOUS PRN
Status: DISCONTINUED | OUTPATIENT
Start: 2022-11-18 | End: 2022-11-18

## 2022-11-18 RX ORDER — PROPOFOL 10 MG/ML
INJECTION, EMULSION INTRAVENOUS AS NEEDED
Status: DISCONTINUED | OUTPATIENT
Start: 2022-11-18 | End: 2022-11-18

## 2022-11-18 RX ADMIN — PROPOFOL 50 MG: 10 INJECTION, EMULSION INTRAVENOUS at 10:55

## 2022-11-18 RX ADMIN — SODIUM CHLORIDE, SODIUM LACTATE, POTASSIUM CHLORIDE, AND CALCIUM CHLORIDE: .6; .31; .03; .02 INJECTION, SOLUTION INTRAVENOUS at 10:40

## 2022-11-18 RX ADMIN — PROPOFOL 50 MG: 10 INJECTION, EMULSION INTRAVENOUS at 10:49

## 2022-11-18 RX ADMIN — PROPOFOL 50 MG: 10 INJECTION, EMULSION INTRAVENOUS at 10:59

## 2022-11-18 RX ADMIN — PROPOFOL 100 MG: 10 INJECTION, EMULSION INTRAVENOUS at 10:47

## 2022-11-18 RX ADMIN — PROPOFOL 50 MG: 10 INJECTION, EMULSION INTRAVENOUS at 10:52

## 2022-11-18 NOTE — ANESTHESIA POSTPROCEDURE EVALUATION
Post-Op Assessment Note    CV Status:  Stable  Pain Score: 0    Pain management: adequate     Mental Status:  Arousable and sleepy   Hydration Status:  Stable   PONV Controlled:  Controlled   Airway Patency:  Patent      Post Op Vitals Reviewed: Yes      Staff: CRNA         No notable events documented      BP   106/69   Temp 97 6   Pulse 64   Resp 14   SpO2 99

## 2022-11-18 NOTE — H&P
History and Physical   Colon and Rectal Surgery   Consuelo Mar 59 y o  female MRN: [id number]  Unit/Bed#:  Encounter: [id number]  11/18/22   10:41 AM      CC:  History of polyp that showed no neoplasm    History of Present Illness   HPI:  Consuelo Mar is a 59 y o  female with no GI symptoms  Historical Information   Past Medical History:   Diagnosis Date   • Anxiety    • Endometrial polyp 10/31/2018   • Migraines    • Restless leg syndrome      Past Surgical History:   Procedure Laterality Date   • BACK SURGERY     • COLONOSCOPY     • COLPOSCOPY  04/2004   • POLYPECTOMY N/A 12/07/2018    Procedure: POLYPECTOMY;  Surgeon: Justen Nava MD;  Location: [facility];  Service: Gynecology   • NJ HYSTEROSCOPY,W/ENDO BX N/A 12/07/2018    Procedure: DILATATION AND CURETTAGE (D&C) WITH HYSTEROSCOPY  NEED myosure;  Surgeon: Justen Nava MD;  Location: [facility];  Service: Gynecology       Meds/Allergies     (Not in a hospital admission)        Current Outpatient Medications:   •  Calcium Carbonate-Vitamin D (CALCIUM-D PO), Take by mouth, Disp: , Rfl:   •  Cholecalciferol (Vitamin D3 Ultra Strength) 125 MCG (5000 UT) capsule, Take 5,000 Units by mouth daily, Disp: , Rfl:   •  citalopram (CeleXA) 20 mg tablet, Take 20 mg by mouth daily at bedtime  , Disp: , Rfl:   •  SUMAtriptan (IMITREX) 100 mg tablet, , Disp: , Rfl:   •  denosumab (PROLIA) 60 mg/mL, INJECT 60MG SUBCUTANEOUSLY  EVERY 6 MONTHS (GIVEN AT  PRESCRIBERS OFFICE), Disp: , Rfl:   •  LYSINE PO, Take by mouth, Disp: , Rfl:   No current facility-administered medications for this encounter      Facility-Administered Medications Ordered in Other Encounters:   •  lactated ringers infusion, , Intravenous, Continuous PRN, Joyimichell Inch, CRNA, New Bag at 11/18/22 1040    No Known Allergies      Social History   Social History     Substance and Sexual Activity   Alcohol Use No     Social History     Substance and Sexual Activity   Drug Use No     Social History     Tobacco Use   Smoking Status Former   • Packs/day: 0 50   • Types: Cigarettes   • Quit date:    • Years since quittin 8   Smokeless Tobacco Never         Family History:   Family History   Problem Relation Age of Onset   • Hypertension Father    • Prostate cancer Father    • No Known Problems Sister    • Thyroid cancer Daughter    • Cancer Maternal Grandmother    • Cervical cancer Paternal Grandmother    • No Known Problems Sister    • No Known Problems Maternal Aunt    • No Known Problems Paternal Aunt    • No Known Problems Paternal Grandfather    • No Known Problems Brother    • No Known Problems Maternal Aunt    • Breast cancer Neg Hx    • Colon cancer Neg Hx    • Ovarian cancer Neg Hx    • Uterine cancer Neg Hx      Review of Systems - General ROS: negative  Respiratory ROS: negative  Cardiovascular ROS: negative     Objective     Current Vitals:   Blood Pressure: 114/66 (22)  Pulse: 67 (22)  Temperature: (!) 96 9 °F (36 1 °C) (22)  Temp Source: Temporal (22)  Respirations: 18 (22)  Height: 5' 6" (167 6 cm) (22)  Weight - Scale: 73 5 kg (162 lb) (22)  SpO2: 97 % (22)  No intake or output data in the 24 hours ending 22 1041    Physical Exam:  General:  Well nourished, no distress  Neuro: Alert and oriented  Eyes:Sclera anicteric, conjunctiva pink  Pulm: Clear to auscultation bilaterally  No respiratory Distress  CV:  Regular rate and rhythm  No murmurs  Abdomen:  Soft, flat, non-tender, without masses or hepatosplenomegaly  Lab Results:       ASSESSMENT:  William Flores is a 59 y o  female for screening  PLAN:  Colonoscopy  Risks , including, but not limited to, bleeding, perforation, missed lesions, and potential need for surgery, were reviewed  Alternatives to colonoscopy were discussed    Kiesha Schwartz MD

## 2022-11-18 NOTE — ANESTHESIA PREPROCEDURE EVALUATION
Procedure:  COLONOSCOPY    Relevant Problems   ENDO   (+) Hyperthyroidism        Physical Exam    Airway    Mallampati score: I  TM Distance: >3 FB  Neck ROM: full     Dental       Cardiovascular  Cardiovascular exam normal    Pulmonary  Pulmonary exam normal     Other Findings        Anesthesia Plan  ASA Score- 2     Anesthesia Type- IV sedation with anesthesia with ASA Monitors  Additional Monitors:   Airway Plan:           Plan Factors-Exercise tolerance (METS): >4 METS  Chart reviewed  EKG reviewed  Imaging results reviewed  Existing labs reviewed  Patient summary reviewed  Induction- intravenous  Postoperative Plan- Plan for postoperative opioid use  Planned trial extubation    Informed Consent- Anesthetic plan and risks discussed with patient  I personally reviewed this patient with the CRNA  Discussed and agreed on the Anesthesia Plan with the CRNA  Lola Brown

## 2023-02-20 NOTE — TELEPHONE ENCOUNTER
Caller: Deanna    Relationship: Self    Best call back number: [phone]    Requested Prescriptions:   Requested Prescriptions     Pending Prescriptions Disp Refills   • ALPRAZolam (XANAX) 0.5 MG tablet 8 tablet 0     Sig: Take 1 tablet by mouth Every 12 (Twelve) Hours.   • Vyvanse 50 MG capsule 4 capsule 0     Sig: Take 1 capsule by mouth Every Morning        Pharmacy where request should be sent: [address] [phone] [facility] [phone] [facility]     Additional details provided by patient: PATIENT IS OUT OF MEDICATIONS.    Does the patient have less than a 3 day supply:  [x] Yes  [] No    Would you like a call back once the refill request has been completed: [] Yes [x] No    If the office needs to give you a call back, can they leave a voicemail: [] Yes [x] No    Oliver Cristina Rep   02/20/23 11:47 EST          ----- [facility] [address] sent at 10/26/2018  1:24 PM EDT -----  Please notify patient her pelvic u/s was normal except for a slight enlargement of the polyp she had on last u/s  Because of her continued spotting I would like her to see an MD for possible hysteroscopy/polypectomy  Pls arrange for this  Thanks

## 2023-03-03 ENCOUNTER — ANNUAL EXAM (OUTPATIENT)
Dept: OBGYN CLINIC | Facility: MEDICAL CENTER | Age: 58
End: 2023-03-03

## 2023-03-03 VITALS
DIASTOLIC BLOOD PRESSURE: 74 MMHG | SYSTOLIC BLOOD PRESSURE: 110 MMHG | BODY MASS INDEX: 26.13 KG/M2 | WEIGHT: 162.6 LBS | HEIGHT: 66 IN

## 2023-03-03 DIAGNOSIS — Z12.31 ENCOUNTER FOR SCREENING MAMMOGRAM FOR MALIGNANT NEOPLASM OF BREAST: Primary | ICD-10-CM

## 2023-03-03 DIAGNOSIS — Z01.419 ENCOUNTER FOR GYNECOLOGICAL EXAMINATION WITHOUT ABNORMAL FINDING: ICD-10-CM

## 2023-03-03 RX ORDER — VALACYCLOVIR HYDROCHLORIDE 1 G/1
2000 TABLET, FILM COATED ORAL EVERY 12 HOURS
COMMUNITY
Start: 2023-01-22

## 2023-03-03 NOTE — ASSESSMENT & PLAN NOTE
60 yo here for annual    Pap 2/22 NILM, HPV neg- remote excision, continue q3 until 63  Mammo due 3/23  Recomemnd breast self awareness  Colonoscopy due 2032  Osteoporosis managed by Rheum  Recommend healthy diet and exercise  Sexually active without issues

## 2023-05-26 ENCOUNTER — HOSPITAL ENCOUNTER (OUTPATIENT)
Dept: RADIOLOGY | Facility: HOSPITAL | Age: 58
Discharge: HOME | End: 2023-05-26

## 2023-05-26 VITALS — HEIGHT: 66 IN | BODY MASS INDEX: 25.71 KG/M2 | WEIGHT: 160 LBS

## 2023-05-26 DIAGNOSIS — Z12.31 ENCOUNTER FOR SCREENING MAMMOGRAM FOR MALIGNANT NEOPLASM OF BREAST: ICD-10-CM

## 2023-06-18 ENCOUNTER — APPOINTMENT (OUTPATIENT)
Dept: URGENT CARE | Facility: CLINIC | Age: 58
End: 2023-06-18

## 2023-06-18 DIAGNOSIS — Z00.00 PHYSICAL EXAM: ICD-10-CM

## 2023-06-18 LAB
CHOLEST SERPL-MCNC: 155 MG/DL
EST. AVERAGE GLUCOSE BLD GHB EST-MCNC: 117 MG/DL
HBA1C MFR BLD: 5.7 %
HDLC SERPL-MCNC: 76 MG/DL
LDLC SERPL CALC-MCNC: 72 MG/DL (ref 0–100)
NONHDLC SERPL-MCNC: 79 MG/DL
TRIGL SERPL-MCNC: 33 MG/DL

## 2023-06-18 PROCEDURE — 80061 LIPID PANEL: CPT | Performed by: PREVENTIVE MEDICINE

## 2023-06-18 PROCEDURE — 83036 HEMOGLOBIN GLYCOSYLATED A1C: CPT | Performed by: PREVENTIVE MEDICINE

## 2023-12-24 NOTE — PROGRESS NOTES
12/24/23 1305   Service Assessment   Patient Orientation Alert and Oriented;Person;Place   Cognition Alert   History Provided By Patient   Primary Caregiver Self   Support Systems Children;Family Members   PCP Verified by CM No  (Patient said her PCP is at the nursing home [address] [place] [address].)   Prior Functional Level Assistance with the following:;Bathing;Dressing; Toileting;Mobility   Current Functional Level Assistance with the following:;Bathing;Dressing; Toileting;Mobility   Can patient return to prior living arrangement Yes   Ability to make needs known: Fair   Family able to assist with home care needs: No   Financial Resources  Resources None   Social/Functional History   Lives With Other (comment)  (Patient resides at [address] [place] [address].)   Type of [facility]   (Patient has all needed DME at [facility] Needs assistance   Toileting Needs assistance   [facility] Needs assistance   Ambulation Assistance Needs assistance   Transfer Assistance Needs assistance   Active  No   Patient's  Info Medicaid Transport   Occupation On disability   Discharge Planning   Type of [facility]  ([address] [place] [address])   Living Arrangements Other (Comment)  (Patient resides at [address] [place] [address])   Current Services Prior To Admission   (Patient has all needed DME at [address] [place] [address].)   Current DME Prior to [facility] Other (Comment)  (Patient has all needed DME at [address] [place] [address])   DME Ordered?  No   Potential Assistance Purchasing Medications No   Type of Home Care Services None   Patient expects to be discharged to: Long-term care   Services At/After Discharge   Transition of Care Consult (CM Consult) [facility] Discharge Long term care   Mode of Transport at Discharge BLS   Confirm Follow Up Transport Other (see comment)  (Medicaid Transport) Assessment/Plan:    Encounter for gynecological examination without abnormal finding  60 yo here for annual    Pap 2/22 NILM, HPV neg- remote excision, continue q3 until 63  Mammo due 3/23  Recomemnd breast self awareness  Colonoscopy due 2032  Osteoporosis managed by Rheum  Recommend healthy diet and exercise  Sexually active without issues  Diagnoses and all orders for this visit:    Encounter for screening mammogram for malignant neoplasm of breast  -     Mammo screening bilateral w 3d & cad; Future    Encounter for gynecological examination without abnormal finding    Other orders  -     valACYclovir (VALTREX) 1,000 mg tablet; Take 2,000 mg by mouth every 12 (twelve) hours          Subjective:      Patient ID: Scar Manzanares is a 59 y o  female  60 yo here for annual exam  No bleeding  No vulvar or breast concerns  No pelvic pain  Feeling well  Sexually active without problems  Just got back from [facility] and loved it  She has a trip to [place] to bike and a couple trips to [facility] to visit her brother and for her birthday planned  The following portions of the patient's history were reviewed and updated as appropriate: allergies, current medications, past family history, past medical history, past social history, past surgical history and problem list     Review of Systems   Constitutional: Negative for chills and fever  HENT: Negative for ear pain and sore throat  Eyes: Negative for pain and visual disturbance  Respiratory: Negative for cough and shortness of breath  Cardiovascular: Negative for chest pain and palpitations  Gastrointestinal: Negative for abdominal pain, constipation, diarrhea, nausea and vomiting  Genitourinary: Negative for dyspareunia, dysuria, frequency, hematuria, pelvic pain, urgency, vaginal bleeding, vaginal discharge and vaginal pain  Musculoskeletal: Negative for arthralgias and back pain  Skin: Negative for color change and rash     Neurological: Negative for seizures and syncope  All other systems reviewed and are negative  Objective:      /74 (BP Location: Left arm, Patient Position: Sitting)   Ht 5' 6" (1 676 m)   Wt 73 8 kg (162 lb 9 6 oz)   LMP 02/01/2018   BMI 26 24 kg/m²          Physical Exam  Constitutional:       Appearance: She is well-developed  HENT:      Head: Normocephalic and atraumatic  Neck:      Thyroid: No thyromegaly  Pulmonary:      Effort: Pulmonary effort is normal    Chest:   Breasts:     Breasts are symmetrical       Right: No inverted nipple, mass, nipple discharge, skin change or tenderness  Left: No inverted nipple, mass, nipple discharge, skin change or tenderness  Abdominal:      General: There is no distension  Palpations: Abdomen is soft  There is no mass  Tenderness: There is no abdominal tenderness  There is no guarding or rebound  Genitourinary:     Exam position: Supine  Labia:         Right: No rash, tenderness, lesion or injury  Left: No rash, tenderness, lesion or injury  Vagina: No signs of injury  No vaginal discharge, erythema, tenderness or bleeding  Cervix: No cervical motion tenderness, discharge or friability  Uterus: Not enlarged and not tender  Adnexa:         Right: No mass, tenderness or fullness  Left: No mass, tenderness or fullness  Comments: Vagina with atrophy consistent with postmenopausal status  Urethral meatus normal without lesion, discharge  No grossly appreciated pelvic organ prolapse  Rectovaginal exam without nodularity or masses  Musculoskeletal:      Cervical back: Normal range of motion  Lymphadenopathy:      Upper Body:      Right upper body: No supraclavicular, axillary or pectoral adenopathy  Left upper body: No supraclavicular, axillary or pectoral adenopathy

## 2024-02-21 PROBLEM — Z01.419 ENCOUNTER FOR GYNECOLOGICAL EXAMINATION WITHOUT ABNORMAL FINDING: Status: RESOLVED | Noted: 2018-10-16 | Resolved: 2024-02-21

## 2024-03-11 ENCOUNTER — ANNUAL EXAM (OUTPATIENT)
Dept: OBGYN CLINIC | Facility: MEDICAL CENTER | Age: 59
End: 2024-03-11
Payer: COMMERCIAL

## 2024-03-11 VITALS
SYSTOLIC BLOOD PRESSURE: 110 MMHG | HEIGHT: 65 IN | BODY MASS INDEX: 26.36 KG/M2 | DIASTOLIC BLOOD PRESSURE: 78 MMHG | WEIGHT: 158.2 LBS

## 2024-03-11 DIAGNOSIS — Z12.31 ENCOUNTER FOR SCREENING MAMMOGRAM FOR MALIGNANT NEOPLASM OF BREAST: Primary | ICD-10-CM

## 2024-03-11 DIAGNOSIS — Z01.419 ENCOUNTER FOR GYNECOLOGICAL EXAMINATION WITHOUT ABNORMAL FINDING: ICD-10-CM

## 2024-03-11 PROCEDURE — S0612 ANNUAL GYNECOLOGICAL EXAMINA: HCPCS | Performed by: STUDENT IN AN ORGANIZED HEALTH CARE EDUCATION/TRAINING PROGRAM

## 2024-03-11 RX ORDER — ALPRAZOLAM 0.5 MG/1
0.5 TABLET ORAL 3 TIMES DAILY
COMMUNITY
Start: 2024-02-02

## 2024-03-11 RX ORDER — PANTOPRAZOLE SODIUM 40 MG/1
40 TABLET, DELAYED RELEASE ORAL DAILY
COMMUNITY
Start: 2024-02-29

## 2024-03-11 NOTE — PROGRESS NOTES
Assessment/Plan:    Encounter for gynecological examination without abnormal finding  59 yo here for annual exam    Pap 2/22 NILM, HPV negative, due 2027  Mammo due 5/24. Recommend breast self awarness  Colonoscopy due 2032  Healthy diet and exercise  Sexually active     Diagnoses and all orders for this visit:    Encounter for screening mammogram for malignant neoplasm of breast  -     Mammo screening bilateral w 3d & cad; Future    Encounter for gynecological examination without abnormal finding    Other orders  -     ALPRAZolam (XANAX) 0.5 mg tablet; Take 0.5 mg by mouth 3 (three) times a day  -     pantoprazole (PROTONIX) 40 mg tablet; Take 40 mg by mouth daily          Subjective:      Patient ID: Rosa Elena Ernandez is a 58 y.o. female.    59 yo here for annual exam. No bleeding, vulvar or breast concerns. No pelvic pain. Sexually active without problems. Going to [place] and then [facility] this summer.  is retiring. Currently being worked up for stomach ulcer, has GI appt in July.      The following portions of the patient's history were reviewed and updated as appropriate: allergies, current medications, past family history, past medical history, past social history, past surgical history, and problem list.    Review of Systems   Constitutional:  Negative for chills and fever.   HENT:  Negative for ear pain and sore throat.    Eyes:  Negative for pain and visual disturbance.   Respiratory:  Negative for cough and shortness of breath.    Cardiovascular:  Negative for chest pain and palpitations.   Gastrointestinal:  Positive for abdominal pain (epigastric). Negative for blood in stool, constipation, diarrhea, nausea and vomiting.   Genitourinary:  Negative for dyspareunia, dysuria, frequency, hematuria, urgency, vaginal bleeding, vaginal discharge and vaginal pain.   Musculoskeletal:  Negative for arthralgias and back pain.   Skin:  Negative for color change and rash.   Neurological:  Negative for seizures and  "syncope.   All other systems reviewed and are negative.        Objective:      /78 (BP Location: Left arm, Patient Position: Sitting, Cuff Size: Standard)   Ht 5' 5.25\" (1.657 m)   Wt 71.8 kg (158 lb 3.2 oz)   LMP 02/01/2018   BMI 26.12 kg/m²          Physical Exam  Constitutional:       Appearance: She is well-developed.   HENT:      Head: Normocephalic and atraumatic.   Neck:      Thyroid: No thyromegaly.   Pulmonary:      Effort: Pulmonary effort is normal.   Chest:   Breasts:     Breasts are symmetrical.      Right: No inverted nipple, mass, nipple discharge, skin change or tenderness.      Left: No inverted nipple, mass, nipple discharge, skin change or tenderness.       Abdominal:      General: There is no distension.      Palpations: Abdomen is soft. There is no mass.      Tenderness: There is no abdominal tenderness. There is no guarding or rebound.   Genitourinary:     Exam position: Supine.      Labia:         Right: No rash, tenderness, lesion or injury.         Left: No rash, tenderness, lesion or injury.       Vagina: No signs of injury. No vaginal discharge, erythema, tenderness or bleeding.      Cervix: No cervical motion tenderness, discharge or friability.      Uterus: Not enlarged and not tender.       Adnexa:         Right: No mass, tenderness or fullness.          Left: No mass, tenderness or fullness.        Comments: Vagina with atrophy consistent with postmenopausal status. Urethral meatus normal without lesion, discharge. No grossly appreciated pelvic organ prolapse. Rectovaginal exam without nodularity or masses  Musculoskeletal:      Cervical back: Normal range of motion.   Lymphadenopathy:      Upper Body:      Right upper body: No supraclavicular, axillary or pectoral adenopathy.      Left upper body: No supraclavicular, axillary or pectoral adenopathy.         "

## 2024-03-11 NOTE — ASSESSMENT & PLAN NOTE
59 yo here for annual exam    Pap 2/22 NILM, HPV negative, due 2027  Mammo due 5/24. Recommend breast self awarness  Colonoscopy due 2032  Healthy diet and exercise  Sexually active

## 2024-05-01 PROBLEM — Z01.419 ENCOUNTER FOR GYNECOLOGICAL EXAMINATION WITHOUT ABNORMAL FINDING: Status: RESOLVED | Noted: 2024-03-11 | Resolved: 2024-05-01

## 2024-07-02 ENCOUNTER — HOSPITAL ENCOUNTER (OUTPATIENT)
Dept: RADIOLOGY | Facility: HOSPITAL | Age: 59
Discharge: HOME | End: 2024-07-02
Payer: COMMERCIAL

## 2024-07-02 VITALS — WEIGHT: 147 LBS | BODY MASS INDEX: 24.49 KG/M2 | HEIGHT: 65 IN

## 2024-07-02 DIAGNOSIS — Z12.31 ENCOUNTER FOR SCREENING MAMMOGRAM FOR MALIGNANT NEOPLASM OF BREAST: ICD-10-CM

## 2024-07-02 PROCEDURE — 77063 BREAST TOMOSYNTHESIS BI: CPT

## 2024-07-02 PROCEDURE — 77067 SCR MAMMO BI INCL CAD: CPT

## 2025-02-05 LAB — HCV AB SER-ACNC: NON REACTIVE

## 2025-03-18 ENCOUNTER — ANNUAL EXAM (OUTPATIENT)
Dept: OBGYN CLINIC | Facility: MEDICAL CENTER | Age: 60
End: 2025-03-18
Payer: MEDICARE

## 2025-03-18 VITALS
SYSTOLIC BLOOD PRESSURE: 106 MMHG | DIASTOLIC BLOOD PRESSURE: 70 MMHG | HEIGHT: 65 IN | WEIGHT: 157 LBS | BODY MASS INDEX: 26.16 KG/M2

## 2025-03-18 DIAGNOSIS — Z01.419 ENCOUNTER FOR ANNUAL ROUTINE GYNECOLOGICAL EXAMINATION: Primary | ICD-10-CM

## 2025-03-18 DIAGNOSIS — R39.9 UTI SYMPTOMS: ICD-10-CM

## 2025-03-18 DIAGNOSIS — Z12.4 PAP SMEAR FOR CERVICAL CANCER SCREENING: ICD-10-CM

## 2025-03-18 DIAGNOSIS — K52.9 INFLAMMATORY BOWEL DISEASE: ICD-10-CM

## 2025-03-18 DIAGNOSIS — Z12.31 ENCOUNTER FOR SCREENING MAMMOGRAM FOR BREAST CANCER: ICD-10-CM

## 2025-03-18 PROBLEM — Z72.0 TOBACCO USE: Status: RESOLVED | Noted: 2018-10-31 | Resolved: 2025-03-18

## 2025-03-18 LAB
BACTERIA UR QL AUTO: NORMAL /HPF
BILIRUB UR QL STRIP: NEGATIVE
CLARITY UR: CLEAR
COLOR UR: COLORLESS
GLUCOSE UR STRIP-MCNC: NEGATIVE MG/DL
HGB UR QL STRIP.AUTO: NEGATIVE
KETONES UR STRIP-MCNC: NEGATIVE MG/DL
LEUKOCYTE ESTERASE UR QL STRIP: NEGATIVE
NITRITE UR QL STRIP: NEGATIVE
NON-SQ EPI CELLS URNS QL MICRO: NORMAL /HPF
PH UR STRIP.AUTO: 6.5 [PH]
PROT UR STRIP-MCNC: NEGATIVE MG/DL
RBC #/AREA URNS AUTO: NORMAL /HPF
SP GR UR STRIP.AUTO: 1.01 (ref 1–1.03)
UROBILINOGEN UR STRIP-ACNC: <2 MG/DL
WBC #/AREA URNS AUTO: NORMAL /HPF

## 2025-03-18 PROCEDURE — 87086 URINE CULTURE/COLONY COUNT: CPT | Performed by: STUDENT IN AN ORGANIZED HEALTH CARE EDUCATION/TRAINING PROGRAM

## 2025-03-18 PROCEDURE — 87186 SC STD MICRODIL/AGAR DIL: CPT | Performed by: STUDENT IN AN ORGANIZED HEALTH CARE EDUCATION/TRAINING PROGRAM

## 2025-03-18 PROCEDURE — G0476 HPV COMBO ASSAY CA SCREEN: HCPCS | Performed by: STUDENT IN AN ORGANIZED HEALTH CARE EDUCATION/TRAINING PROGRAM

## 2025-03-18 PROCEDURE — 87077 CULTURE AEROBIC IDENTIFY: CPT | Performed by: STUDENT IN AN ORGANIZED HEALTH CARE EDUCATION/TRAINING PROGRAM

## 2025-03-18 PROCEDURE — 81001 URINALYSIS AUTO W/SCOPE: CPT | Performed by: STUDENT IN AN ORGANIZED HEALTH CARE EDUCATION/TRAINING PROGRAM

## 2025-03-18 PROCEDURE — G0101 CA SCREEN;PELVIC/BREAST EXAM: HCPCS | Performed by: STUDENT IN AN ORGANIZED HEALTH CARE EDUCATION/TRAINING PROGRAM

## 2025-03-18 PROCEDURE — G0145 SCR C/V CYTO,THINLAYER,RESCR: HCPCS | Performed by: STUDENT IN AN ORGANIZED HEALTH CARE EDUCATION/TRAINING PROGRAM

## 2025-03-18 RX ORDER — DICYCLOMINE HCL 20 MG
TABLET ORAL
COMMUNITY
Start: 2025-03-13

## 2025-03-18 RX ORDER — PREDNISOLONE ACETATE 10 MG/ML
SUSPENSION/ DROPS OPHTHALMIC
COMMUNITY
Start: 2025-02-28

## 2025-03-18 RX ORDER — MELOXICAM 15 MG/1
15 TABLET ORAL DAILY
COMMUNITY

## 2025-03-18 NOTE — PROGRESS NOTES
Name: Rosa Elena Ernandez      : 1965      MRN: [id number]  Encounter Provider: Rina Gomez MD  Encounter Date: 3/18/2025   Encounter department: [facility] [place]  :  Assessment & Plan  Encounter for annual routine gynecological examination  60 yo here for annual exam    Pap  NILM, HPV negative. Cotest collected, plan for q3 until 63 due to distant excision.  Mammo due . Recommend breast self awareness  Colonoscopy due   Healthy diet and exercise  Sexually active without problems.          Encounter for screening mammogram for breast cancer    Orders:    Mammo screening bilateral w 3d and cad; Future    Pap smear for cervical cancer screening    Orders:    Liquid-based pap, screening    Inflammatory bowel disease  Crohns, following with GI  Had a sever reaction to remicaid, now on skyrizi         Plans to continue annual    History of Present Illness   HPI  Rosa Elena Ernandez is a 59 y.o. female who presents for annual exam. No bleeding. No vulvar concerns. No pelvic pain. Does have occasional dysuria with dark urine. Sexually active without problems.  was just diagnosed with bladder cancer. He has to have a radical cystectomy due to invasion and has upcoming visits [facility].   History obtained from: patient    Review of Systems   Constitutional:  Negative for chills and fever.   HENT:  Negative for ear pain and sore throat.    Eyes:  Negative for pain and visual disturbance.   Respiratory:  Negative for cough and shortness of breath.    Cardiovascular:  Negative for chest pain and palpitations.   Gastrointestinal:  Negative for abdominal pain, constipation, diarrhea, nausea and vomiting.   Genitourinary:  Negative for dyspareunia, dysuria, frequency, hematuria, pelvic pain, urgency, vaginal bleeding, vaginal discharge and vaginal pain.   Musculoskeletal:  Negative for arthralgias and back pain.   Skin:  Negative for color change and  "rash.   Neurological:  Negative for seizures and syncope.   All other systems reviewed and are negative.    Medical History Reviewed by provider this encounter:     .     Objective   /70 (BP Location: Left arm, Patient Position: Sitting, Cuff Size: Standard)   Ht 5' 5.25\" (1.657 m)   Wt 71.2 kg (157 lb)   LMP 02/01/2018   BMI 25.93 kg/m²      Physical Exam  Vitals and nursing note reviewed.   Constitutional:       General: She is not in acute distress.     Appearance: She is well-developed.   HENT:      Head: Normocephalic and atraumatic.   Eyes:      Conjunctiva/sclera: Conjunctivae normal.   Cardiovascular:      Rate and Rhythm: Normal rate and regular rhythm.      Heart sounds: No murmur heard.  Pulmonary:      Effort: Pulmonary effort is normal. No respiratory distress.      Breath sounds: Normal breath sounds.   Chest:   Breasts:     Breasts are symmetrical.      Right: No swelling, bleeding, inverted nipple, mass, nipple discharge, skin change or tenderness.      Left: No swelling, bleeding, inverted nipple, mass, nipple discharge, skin change or tenderness.   Abdominal:      Palpations: Abdomen is soft.      Tenderness: There is no abdominal tenderness.   Genitourinary:     Labia:         Right: No rash, tenderness, lesion or injury.         Left: No rash, tenderness, lesion or injury.       Urethra: No prolapse, urethral pain, urethral swelling or urethral lesion.      Vagina: No vaginal discharge, erythema, tenderness or bleeding.      Cervix: No friability, lesion, erythema or cervical bleeding.      Uterus: Not enlarged, not fixed and not tender.       Adnexa: Right adnexa normal and left adnexa normal.        Right: No mass, tenderness or fullness.          Left: No mass, tenderness or fullness.        Comments: Rectovaginal exam without nodularity or masses.   Musculoskeletal:         General: No swelling.      Cervical back: Neck supple.   Lymphadenopathy:      Upper Body:      Right upper " body: No supraclavicular, axillary or pectoral adenopathy.      Left upper body: No supraclavicular, axillary or pectoral adenopathy.   Skin:     General: Skin is warm and dry.      Capillary Refill: Capillary refill takes less than 2 seconds.   Neurological:      General: No focal deficit present.      Mental Status: She is alert. Mental status is at baseline.   Psychiatric:         Mood and Affect: Mood normal.         Behavior: Behavior normal.         Thought Content: Thought content normal.         Judgment: Judgment normal.         Administrative Statements   I have spent a total time of 15 minutes in caring for this patient on the day of the visit/encounter including Impressions, Counseling / Coordination of care, Documenting in the medical record, Reviewing/placing orders in the medical record (including tests, medications, and/or procedures), and Obtaining or reviewing history  .

## 2025-03-18 NOTE — ASSESSMENT & PLAN NOTE
Crohns, following with GI  Had a sever reaction to remicaid, now on [employer]          Physical Therapy    Visit Type: initial evaluation    Relevant History/Co-morbidities: admit to OSH w/ chest and back pain. cardiac cath showed severe multivessel disease. IABP placed   02/09: t/f to [facility]   02/12: CABG x4   02/13: IABP removed    SUBJECTIVE  Patient agreed to participate in therapy this date.  Pt with minimal verbalizations d/t high levels of pain.  Patient / Family Goal: return home    Pain   RN informed on pain level.    Location: back    At onset of session (out of 10): 9     OBJECTIVE     Cognitive Status   Orientation    - Oriented to: person, place, time and situation  Functional Communication   - Overall Status: within functional limits  Vitals:  Rest  · HR: 115  · BP: 117/61  · SpO2: 100  · Supplemental O2 (in L): 6    Post Activity    HR: 114  · BP: 145/71  · Supplemental O2 (in L): \"        Sitting Balance  (ALAN = base of support)  Static      - Trial 1 details: moderate assist  Dynamic      - Trial 1 details: moderate assist  Pt with posterior leaning in standing     Transfers  Assistive devices: 2-wheeled walker  - Sit to stand: moderate assist  - Stand to sit: moderate assist  Ambulation / Gait  Ambulation deferred d/t high levels of back pain   Interventions     Standing    Lower Extremity: Bilateral: marching, AROM, Standing LE reps: 2.   Training provided: activity tolerance, balance retraining, positioning, transfer training, use of assistive device and safety training    Skilled input: Verbal instruction/cues  Verbal Consent: Writer verbally educated and received verbal consent for hand placement, positioning of patient, and techniques to be performed today from patient for therapist position for techniques as described above and how they are pertinent to the patient's plan of care.       Education:   - Present and ready to learn: patient  Education provided during session:  - Role of physical therapy, importance of mobility, discharge recommendations, sternal precautions  -  Results of above outlined education: Verbalizes understanding    ASSESSMENT   Patient will benefit from skilled therapy to address listed impairments and functional limitations.    Discharge needs based on today's assessment:   - Current level of function: significantly below baseline level of function   Pt dc needs: TBD pending progress with therapy.   - Activities of daily living (ADLs) requiring support at discharge: bed mobility, transfers, ambulation and stairs   - Impairments that require further therapy intervention: activity tolerance, balance, pain and strength    AM-PAC  - Generalized Prior Level of Function: IND/MOD I ([facility] 22-24)       Key: MOD A=moderate assistance, IND/MOD I=independent/modified independent  - Generalized Current Level of Function     - Current Mobility Score: 9       AM-PAC Scoring Key= >21 Modified Independent; 20-21 Supervision; 18-19 Minimal assist; 13-17 Moderate assist; 9-12 Max assist; <9 Total assist       • Predicted patient presentation: Low (stable) - Patient comorbidities and complexities, as defined above, will have little effect on progress for prescribed plan of care.    PLAN (while hospitalized)  Suggestions for next session as indicated:   PT Frequency: 6-7 x per week      PT/OT Mobility Equipment for Discharge: TBD  Interventions: balance, energy conservation, gait training, strengthening, ROM, bed mobility, equipment eval/education, HEP train/position, safety education, endurance training and functional transfer training  Agreement to plan and goals: patient agrees with goals and treatment plan      GOALS  Long Term Goals: (to be met by time of discharge from hospital)  State precautions: Patient able to state precautions independent.  Maintain precautions: Patient able to maintain precautions independent.  Stand (even surface): Patient will stand on even surface for 3 min, supervision (RW).   Sit to stand: Patient will complete sit to stand transfer with 2-wheeled  walker, supervision.   Stand to sit: Patient will complete stand to sit transfer with 2-wheeled walker, supervision.   Documented in the chart in the following areas: Assessment/Plan.      Patient at End of Session:   Location: in chair  Safety measures: alarm system in place/re-engaged, call light within reach, equipment intact and lines intact  Handoff to: nurse    Therapy procedure time and total treatment time can be found documented on the Time Entry flowsheet

## 2025-03-18 NOTE — ASSESSMENT & PLAN NOTE
60 yo here for annual exam    Pap 2/22 NILM, HPV negative. Cotest collected, plan for q3 until 63 due to distant excision.  Mammo due 7/25. Recommend breast self awareness  Colonoscopy due 2032  Healthy diet and exercise  Sexually active without problems.

## 2025-03-20 ENCOUNTER — RESULTS FOLLOW-UP (OUTPATIENT)
Dept: OBGYN CLINIC | Facility: CLINIC | Age: 60
End: 2025-03-20

## 2025-03-20 DIAGNOSIS — N30.00 ACUTE CYSTITIS WITHOUT HEMATURIA: Primary | ICD-10-CM

## 2025-03-20 LAB — BACTERIA UR CULT: ABNORMAL

## 2025-03-20 RX ORDER — SULFAMETHOXAZOLE AND TRIMETHOPRIM 800; 160 MG/1; MG/1
1 TABLET ORAL EVERY 12 HOURS SCHEDULED
Qty: 6 TABLET | Refills: 0 | Status: SHIPPED | OUTPATIENT
Start: 2025-03-20 | End: 2025-03-23

## 2025-03-21 LAB
LAB AP GYN PRIMARY INTERPRETATION: NORMAL
Lab: NORMAL

## 2025-03-28 ENCOUNTER — TELEPHONE (OUTPATIENT)
Dept: ADMINISTRATIVE | Facility: OTHER | Age: 60
End: 2025-03-28

## 2025-03-28 NOTE — TELEPHONE ENCOUNTER
----- Message from Taiwo ESPOSITO sent at 3/27/2025  2:16 PM EDT -----  Regarding: care gap request  03/27/25 2:16 PM    Hello, our patient attached above has had Hepatitis C completed/performed. Please assist in updating the patient chart by pulling the Care Everywhere (CE) document. The date of service is 2/5/2025.     Thank you,  Taiwo Shepherd MA  [facility] INTERNAL MED

## 2025-03-28 NOTE — TELEPHONE ENCOUNTER
Upon review of the In Basket request we were able to locate, review, and update the patient chart as requested for Hepatitis C .    Any additional questions or concerns should be emailed to the Practice Liaisons via the appropriate education email address, please do not reply via In Basket.    Thank you  Vicki Atkins MA   [facility]

## 2025-04-01 ENCOUNTER — TELEPHONE (OUTPATIENT)
Dept: OBGYN CLINIC | Facility: CLINIC | Age: 60
End: 2025-04-01

## 2025-04-02 ENCOUNTER — APPOINTMENT (OUTPATIENT)
Dept: RADIOLOGY | Facility: AMBULARY SURGERY CENTER | Age: 60
End: 2025-04-02
Attending: SURGERY
Payer: MEDICARE

## 2025-04-02 ENCOUNTER — OFFICE VISIT (OUTPATIENT)
Dept: OBGYN CLINIC | Facility: CLINIC | Age: 60
End: 2025-04-02
Payer: MEDICARE

## 2025-04-02 VITALS — BODY MASS INDEX: 24.01 KG/M2 | HEIGHT: 67 IN | WEIGHT: 153 LBS

## 2025-04-02 DIAGNOSIS — M79.641 PAIN OF RIGHT HAND: ICD-10-CM

## 2025-04-02 DIAGNOSIS — M65.4 DE QUERVAIN'S TENOSYNOVITIS, RIGHT: Primary | ICD-10-CM

## 2025-04-02 PROCEDURE — 73130 X-RAY EXAM OF HAND: CPT

## 2025-04-02 PROCEDURE — 20550 NJX 1 TENDON SHEATH/LIGAMENT: CPT | Performed by: SURGERY

## 2025-04-02 PROCEDURE — 99203 OFFICE O/P NEW LOW 30 MIN: CPT | Performed by: SURGERY

## 2025-04-02 RX ORDER — LIDOCAINE HYDROCHLORIDE 10 MG/ML
1 INJECTION, SOLUTION INFILTRATION; PERINEURAL
Status: COMPLETED | OUTPATIENT
Start: 2025-04-02 | End: 2025-04-02

## 2025-04-02 RX ORDER — DEXAMETHASONE SODIUM PHOSPHATE 10 MG/ML
40 INJECTION, SOLUTION INTRAMUSCULAR; INTRAVENOUS
Status: COMPLETED | OUTPATIENT
Start: 2025-04-02 | End: 2025-04-02

## 2025-04-02 RX ADMIN — LIDOCAINE HYDROCHLORIDE 1 ML: 10 INJECTION, SOLUTION INFILTRATION; PERINEURAL at 13:15

## 2025-04-02 RX ADMIN — DEXAMETHASONE SODIUM PHOSPHATE 40 MG: 10 INJECTION, SOLUTION INTRAMUSCULAR; INTRAVENOUS at 13:15

## 2025-04-02 NOTE — PATIENT INSTRUCTIONS
Thumb spica brace, OT, cortisone injection, oral NSAIDs or topical.   She wishes to proceed with try a cortisone injection, OT and bracing.   She tolerated the injection well.   OT order was placed to start  Patient wished to purchase a thumb spica over the counter and did not take one from the office.

## 2025-04-02 NOTE — PROGRESS NOTES
Marcus Baeza M.D.  Attending, Orthopaedic Surgery  Hand, Wrist, and Elbow Surgery  [facility]      ORTHOPAEDIC HAND, WRIST, AND ELBOW OFFICE  VISIT       ASSESSMENT/PLAN:        Assessment & Plan  Pain of right hand    Orders:    XR hand 3+ vw right; Future    De Quervain's tenosynovitis, right  New xrays of the right hand were obtained today and reviewed at today's visit.   On exam, she is exquisitely tender over the radial styloid process.   Discussed conservative treatment options that include: thumb spica brace, OT, cortisone injection, oral NSAIDs or topical.   She wishes to proceed with try a cortisone injection, OT and bracing.   She tolerated the injection well.   Patient wished to purchase a thumb spica over the counter and did not take one from the office.   We will follow up in 6 weeks for re-evaluation. Orders:    Hand/upper extremity injection: R extensor compartment 1    Ambulatory Referral to PT/OT Hand Therapy; Future          The patient verbalized understanding of exam findings and treatment plan. We engaged in the shared decision-making process and treatment options were discussed at length with the patient. Surgical and conservative management discussed today along with risks and benefits.    Diagnoses and all orders for this visit:    De Quervain's tenosynovitis, right  -     Hand/upper extremity injection: R extensor compartment 1  -     Ambulatory Referral to PT/OT Hand Therapy; Future    Pain of right hand  -     XR hand 3+ vw right; Future        Follow Up:  Return in about 6 weeks (around 5/14/2025) for right wrist.    To Do Next Visit:  Re-evaluation of current issue      General Discussions:  De Quervain Tenosynovitis: The anatomy and physiology of de Quervain's tenosynovitis was discussed with the patient today in the office.  Edema and increased contact pressure within the first dorsal extensor compartment at the radial styloid can cause pain, crepitation,  and limitation of function.  Treatment options include resting thumb spica splints to decrease edema, oral anti-inflammatory medications, home or formal therapy exercises, up to 2 steroid injections within the first dorsal extensor compartment, or surgical release.  While majority of patients do respond to conservative treatment, up to 20% may require surgical release.     ___________________________________________________________________________________________________________________________________________      CHIEF COMPLAINT:  Chief Complaint   Patient presents with    Right Wrist - Pain     Room 9       SUBJECTIVE:  Rosa Elena Ernandez is a 59 y.o. year old RHD female who presents today for initial evaluation for her right hand and wrist pain.  Patient reports that she has been having pain over the thumb and radial side of the wrist for the past 3 months.  She has used an over-the-counter brace that seems to help.  Patient has not tried any other oral medications.      Pain/symptom timing:  Worse during the day when active  Pain/symptom context:  Worse with activites and work  Pain/symptom modifying factors:  Rest makes better, activities make worse  Pain/symptom associated signs/symptoms: none    Prior treatment   NSAIDsNo   Injections No   Bracing/Orthotics Yes    Physical Therapy No     I have personally reviewed all the relevant PMH, PSH, SH, FH, Medications and allergies      PAST MEDICAL HISTORY:  Past Medical History:   Diagnosis Date    Anxiety     Crohn disease (HCC) 03/2024    Endometrial polyp 10/31/2018    Migraines     Restless leg syndrome        PAST SURGICAL HISTORY:  Past Surgical History:   Procedure Laterality Date    BACK SURGERY      BREAST BIOPSY Right     benign    COLONOSCOPY      COLPOSCOPY  04/2004    POLYPECTOMY N/A 12/07/2018    Procedure: POLYPECTOMY;  Surgeon: Marycarmen Stone MD;  Location: [facility];  Service: Gynecology    KS HYSTEROSCOPY BX ENDOMETRIUM&/POLYPC W/WO D&C N/A  2018    Procedure: DILATATION AND CURETTAGE (D&C) WITH HYSTEROSCOPY  NEED myosure;  Surgeon: Marycarmen Stone MD;  Location: [facility];  Service: Gynecology       FAMILY HISTORY:  Family History   Problem Relation Age of Onset    Hypertension Father     Prostate cancer Father     No Known Problems Sister     Thyroid cancer Daughter     Cancer Maternal Grandmother     Cervical cancer Paternal Grandmother     No Known Problems Sister     No Known Problems Maternal Aunt     No Known Problems Paternal Aunt     No Known Problems Paternal Grandfather     No Known Problems Brother     No Known Problems Maternal Aunt     Breast cancer Neg Hx     Colon cancer Neg Hx     Ovarian cancer Neg Hx     Uterine cancer Neg Hx        SOCIAL HISTORY:  Social History     Tobacco Use    Smoking status: Former     Current packs/day: 0.00     Average packs/day: 0.3 packs/day for 10.0 years (2.5 ttl pk-yrs)     Types: Cigarettes     Quit date:      Years since quittin.2    Smokeless tobacco: Never   Vaping Use    Vaping status: Never Used   Substance Use Topics    Alcohol use: No    Drug use: No       MEDICATIONS:    Current Outpatient Medications:     ALPRAZolam (XANAX) 0.5 mg tablet, Take 0.5 mg by mouth 3 (three) times a day, Disp: , Rfl:     Calcium Carbonate-Vitamin D (CALCIUM-D PO), Take by mouth, Disp: , Rfl:     Cholecalciferol (Vitamin D3 Ultra Strength) 125 MCG (5000 UT) capsule, Take 5,000 Units by mouth daily, Disp: , Rfl:     denosumab (PROLIA) 60 mg/mL, INJECT 60MG SUBCUTANEOUSLY  EVERY 6 MONTHS (GIVEN AT  PRESCRIBERS OFFICE), Disp: , Rfl:     prednisoLONE acetate (PRED FORTE) 1 % ophthalmic suspension, PLEASE SEE ATTACHED FOR DETAILED DIRECTIONS, Disp: , Rfl:     SUMAtriptan (IMITREX) 100 mg tablet, , Disp: , Rfl:     valACYclovir (VALTREX) 1,000 mg tablet, Take 2,000 mg by mouth every 12 (twelve) hours, Disp: , Rfl:     dicyclomine (BENTYL) 20 mg tablet, , Disp: , Rfl:     meloxicam (MOBIC) 15 mg tablet,  Take 15 mg by mouth daily, Disp: , Rfl:     pantoprazole (PROTONIX) 40 mg tablet, Take 40 mg by mouth daily, Disp: , Rfl:     ALLERGIES:  No Known Allergies        REVIEW OF SYSTEMS:  Review of Systems   Constitutional:  Negative for chills, fever and unexpected weight change.   HENT:  Negative for hearing loss, nosebleeds and sore throat.    Eyes:  Negative for pain, redness and visual disturbance.   Respiratory:  Negative for cough, shortness of breath and wheezing.    Cardiovascular:  Negative for chest pain, palpitations and leg swelling.   Gastrointestinal:  Negative for abdominal pain and nausea.   Genitourinary:  Negative for dyspareunia, dysuria and frequency.   Skin:  Negative for rash and wound.   Neurological:  Negative for dizziness, numbness and headaches.   Psychiatric/Behavioral:  Negative for decreased concentration and suicidal ideas. The patient is not nervous/anxious.        VITALS:  There were no vitals filed for this visit.    LABS:      _____________________________________________________  PHYSICAL EXAMINATION:  General: well developed and well nourished, alert, oriented times 3, and appears comfortable  Psychiatric: Normal  HEENT: Normocephalic, Atraumatic Trachea Midline, No torticollis  Pulmonary: No audible wheezing or respiratory distress   Abdomen/GI: Non tender, non distended   Cardiovascular: No pitting edema, 2+ radial pulse   Skin: No masses, erythema, lacerations, fluctation, ulcerations  Neurovascular: Sensation Intact to the Median, Ulnar, Radial Nerve, Motor Intact to the Median, Ulnar, Radial Nerve, and Pulses Intact  Musculoskeletal: Normal, except as noted in detailed exam and in HPI.      MUSCULOSKELETAL EXAMINATION:    De Quervain's Tenosynovitis Exam:  right side    Positive tender to palpation over 1st dorsal extensor compartment   Negative palpable nodule  Positive crepitus over 1st dorsal extensor compartment   Positive Finkelstein's test    Positive pain with resisted  "abduction of the thumb       ___________________________________________________  STUDIES REVIEWED:  I have personally reviewed and interpreted  AP lateral and oblique radiographs of xrays of right hand 3 views which demonstrate mild CMC arthrosis        LABS REVIEWED:    HgA1c:   Lab Results   Component Value Date    HGBA1C 5.8 (H) 02/27/2025     BMP:   Lab Results   Component Value Date    CALCIUM 9.5 03/28/2025    K 4.8 03/28/2025    CO2 28 03/28/2025     03/28/2025    BUN 19 03/28/2025    CREATININE 0.71 03/28/2025           PROCEDURES PERFORMED:  Hand/upper extremity injection: R extensor compartment 1  Universal Protocol:  Consent: Verbal consent obtained.  Risks and benefits: risks, benefits and alternatives were discussed  Consent given by: patient  Time out: Immediately prior to procedure a \"time out\" was called to verify the correct patient, procedure, equipment, support staff and site/side marked as required.  Timeout called at: 4/2/2025 1:42 PM.  Patient understanding: patient states understanding of the procedure being performed  Site marked: the operative site was marked  Patient identity confirmed: verbally with patient  Supporting Documentation  Indications: tendon swelling and pain   Procedure Details  Condition:de Quervain's tenosynovitis Site: R extensor compartment 1   Preparation: Patient was prepped and draped in the usual sterile fashion  Needle size: 25 G  Ultrasound guidance: no  Approach: volar  Medications administered: 1 mL lidocaine 1 %; 40 mg dexamethasone 100 mg/10 mL  Patient tolerance: patient tolerated the procedure well with no immediate complications  Dressing:  Sterile dressing applied         _____________________________________________________      Scribe Attestation      I,:  Gretchen Bennett am acting as a scribe while in the presence of the attending physician.:       I,:  Marcus Baeza MD personally performed the services described in this documentation    as " scribed in my presence.:

## 2025-04-08 ENCOUNTER — EVALUATION (OUTPATIENT)
Dept: OCCUPATIONAL THERAPY | Facility: CLINIC | Age: 60
End: 2025-04-08
Payer: MEDICARE

## 2025-04-08 DIAGNOSIS — M65.4 DE QUERVAIN'S TENOSYNOVITIS, RIGHT: Primary | ICD-10-CM

## 2025-04-08 PROCEDURE — 97110 THERAPEUTIC EXERCISES: CPT

## 2025-04-08 PROCEDURE — 97165 OT EVAL LOW COMPLEX 30 MIN: CPT

## 2025-04-08 NOTE — PROGRESS NOTES
OT Evaluation     Today's date: 2025  Patient name: Rosa Elena Ernandez  : 1965  MRN: [id number]  Referring provider: Marcus Baeza MD  Dx:   Encounter Diagnosis     ICD-10-CM    1. De Quervain's tenosynovitis, right  M65.4 Ambulatory Referral to PT/OT Hand Therapy                     Assessment  Impairments: abnormal or restricted ROM, activity intolerance, impaired physical strength, pain with function and weight-bearing intolerance  Symptom irritability: low    Assessment details: Patient presents to OP OT hand therapy services due to a diagnosis of DeQuervain's tenosynovitis. Patient symptoms have been present for around 3 months. Patient had initial appointment with orthopedics on 25. Patient  received CSI injections into the first dorsal compartment and reports no relief at this time. Patient presents with moderate pain in the 1st dorsal compartment during general AROM. Point tenderness located in the 1st dorsal compartment as anticipated but there is no palpable nodule. Wrist ROM is slightly decreased in ulnar deviation. Thumb AROM is decreased secondary to pain. Edema is present at the wrist crease. /pinch strength is decreased as compared to the contralateral side. Positive special tests including Finkelstein's indicative and consistent with diagnosis. Patient was instructed to begin bracing at nighttime to decrease overall symptoms. Patient was provided a custom HEP and instructed on how to complete it. Patient would benefit from skill OT services to decrease pain and increase overall function. See below for a more detailed assessment.     Goals  STG:    Patient will increase wrist AROM in all planes by >20 degrees in 4 weeks    Patient will report overall decreased pain by 1-2 grades in 4 weeks    Patient will increase  strength by >10 lbs in 4 weeks    Patient will increase lateral pinch by >3 lbs in 4 weeks    LTG:    Patient will display wrist AROM WNL as compared to the  "contralateral side in 8 weeks or discharge    Patient will display digit AROM WNL as compared to the contralateral side in 8 weeks or discharge    Patient will report resolution of pain in the 1st dorsal compartment in 8 weeks or discharge    Patient will display  strength WNL compared to the contralateral side in 8 weeks or discharge    Patient will display pinch strength WNL compared to the contralateral side in 8 weeks or discharge      Plan  Patient would benefit from: OT eval and custom splinting  Referral necessary: No  Planned modality interventions: TENS, thermotherapy: hydrocollator packs and ultrasound    Planned therapy interventions: compression, functional ROM exercises, fine motor coordination training, graded activity, graded exercise, home exercise program, IASTM, joint mobilization, kinesiology taping, manual therapy, Sweeney taping, nerve gliding, orthotic management and training, orthotic fitting/training, patient education, strengthening, stretching, therapeutic activities and therapeutic exercise    Frequency: 2x week  Duration in weeks: 10  Plan of Care beginning date: 4/8/2025  Plan of Care expiration date: 6/8/2025  Plan details: Patient would benefit from skilled OP OT hand therapy services 2x a week for 8 weeks to decrease pain, increase strength, and return to full functional status.         Subjective Evaluation    History of Present Illness  Mechanism of injury: Subjective:  \"It still hurts\". \"I got the shot on the 2nd and it hasn't made any difference\". \"I have to use it so I can't just rest it\". \"This has been going on for 3 months on and off\". \"A couple years back I had similar problems with it but it went away\". \"There wasn't any changes 3 months ago\". \"It started slow, there wasn't a day where it just started\". \"It hurts right here (pointing to first dorsal compartment)\". \"The brace does help\". \"I tried taping it yesterday and it helps a lot\". \"\"The injection didn't really " "help\". \"I am one to keep busy all the time\". \"I use a cast iron pan and I can't hold it this way\". \"Taking the cloths out of the dryer is hard, washing dishes, even driving\".           Recurrent probem    Quality of life: good    Patient Goals  Patient goals for therapy: decreased edema, decreased pain, increased motion, increased strength and independence with ADLs/IADLs    Pain  Current pain ratin  At best pain ratin  At worst pain rating: 10  Location: 1st dorsal compartment  Quality: sharp  Relieving factors: rest and support  Aggravating factors: lifting  Progression: no change    Social Support    Employment status: not working  Hand dominance: right      Diagnostic Tests  X-ray: normal  Treatments  Previous treatment: injection treatment  Current treatment: occupational therapy        Objective     Tenderness     Right Wrist/Hand   Tenderness in the first dorsal compartment. No tenderness in the second dorsal compartment and CMC joint.     Neurological Testing     Sensation     Wrist/Hand   Left   Intact: light touch    Right   Intact: light touch    Active Range of Motion     Left Wrist   Wrist flexion: WFL  Wrist extension: WFL  Radial deviation: 35 degrees   Ulnar deviation: 35 degrees     Right Wrist   Wrist flexion: WFL  Wrist extension: WFl  Radial deviation: 20 degrees   Ulnar deviation: 30 degrees with pain    Left Thumb   Kapandji score: 10 degrees      Right Thumb   Kapandji score: 10 degrees    Additional Active Range of Motion Details  B/L Full composite fist    Strength/Myotome Testing     Left Wrist/Hand      (2nd hand position)     Trial 1: 61    Thumb Strength  Key/Lateral Pinch     Trial 1: 13  Tip/Two-Point Pinch     Trial 1: 11  Palmar/Three-Point Pinch     Trial 1: 14    Right Wrist/Hand      (2nd hand position)     Trial 1: 48.2    Thumb Strength   Key/Lateral Pinch     Trial 1: 10  Tip/Two-Point Pinch     Trial 1: 12  Palmar/Three-Point Pinch     Trial 1: 14    Tests "     Right Wrist/Hand   Positive Finkelstein's.   Negative ECU synergy test positive.     Additional Tests Details  Pain with resisted abduction and extension  - pain during resisted flexion and adduction    Swelling     Left Wrist/Hand   Thumb     Proximal: 6 cm    Distal: 5.5 cm  Circumference MCP: 17.8 cm  Circumference wrist: 15.6 cm    Right Wrist/Hand   Thumb     Proximal: 6.1 cm    Distal: 5.5 cm  Circumference MCP: 17.8 cm  Circumference wrist: 15.8 cm             Precautions: Universal      Manuals 4/08            IASTM             Taping Applied to 1st dorsal                                      TE             HEP St dorsal stretch    Wrist AROM            1st dorsal stretch             Med ball rolls             Thumb/Wrist AAROM             Wrist Maze              Thumb isometrics             Power web                                                                                                                                                                         Ther Activity             [place]                                                                 Modalities             P

## 2025-04-15 ENCOUNTER — OFFICE VISIT (OUTPATIENT)
Dept: OCCUPATIONAL THERAPY | Facility: CLINIC | Age: 60
End: 2025-04-15
Payer: MEDICARE

## 2025-04-15 DIAGNOSIS — M65.4 DE QUERVAIN'S TENOSYNOVITIS, RIGHT: Primary | ICD-10-CM

## 2025-04-15 PROCEDURE — 97110 THERAPEUTIC EXERCISES: CPT

## 2025-04-15 PROCEDURE — 97140 MANUAL THERAPY 1/> REGIONS: CPT

## 2025-04-15 NOTE — PROGRESS NOTES
Daily Note     Today's date: 4/15/2025  Patient name: Rosa Elena Ernandez  : 1965  MRN: [id number]  Referring provider: Marcus Baeza MD  Dx:   Encounter Diagnosis     ICD-10-CM    1. De Quervain's tenosynovitis, right  M65.4                      Subjective: I don't feel like the shot helped at all.      Objective: See treatment diary below      Assessment: Tolerated treatment well. Pt was challenged with dexterity exercises. Mild tenderness with IASTM, but she had less tightness post. No pain/discomfort with wrist F/E, but did have discomfort with gentle 1st DC stretch, and passive thumb flexion. She is using the brace for heavier activities and to sleep. KT helps decrease pain with activity during the day. Patient demonstrated fatigue post treatment and would benefit from continued OT      Plan: Continue per plan of care.  Progress treatment as tolerated.       Precautions: Universal      Manuals 4/08 4/15           IASTM  8'           Taping Applied to 1st dorsal X                                     TE             HEP St dorsal stretch    Wrist AROM            1st dorsal stretch  5'           Med ball rolls  3'           Thumb/Wrist AAROM  5'           Wrist Maze   5x           Thumb isometrics             Power web  Y web ecc thumb flexion 2x10                                                                                                                                                                       Ther Activity             Keypegs  Transl in/opp out                                                               Modalities             MHP  5'

## 2025-04-16 ENCOUNTER — OFFICE VISIT (OUTPATIENT)
Dept: OCCUPATIONAL THERAPY | Facility: CLINIC | Age: 60
End: 2025-04-16
Payer: MEDICARE

## 2025-04-16 DIAGNOSIS — M65.4 DE QUERVAIN'S TENOSYNOVITIS, RIGHT: Primary | ICD-10-CM

## 2025-04-16 PROCEDURE — 97140 MANUAL THERAPY 1/> REGIONS: CPT

## 2025-04-16 PROCEDURE — 97110 THERAPEUTIC EXERCISES: CPT

## 2025-04-16 NOTE — PROGRESS NOTES
Daily Note     Today's date: 2025  Patient name: Rosa Elena Ernandez  : 1965  MRN: [id number]  Referring provider: Marcus Baeza MD  Dx:   Encounter Diagnosis     ICD-10-CM    1. De Quervain's tenosynovitis, right  M65.4                      Subjective: It feels sore today (from the resistive thumb flx ex in therapy yesterday).      Objective: See treatment diary below      Assessment: Tolerated treatment well. Less stiffness, but mild pain, with 1st DC stretching. She was less challenged with dexterity today.  Patient demonstrated fatigue post treatment and would benefit from continued OT      Plan: Continue per plan of care.  Progress treatment as tolerated.       Precautions: Universal      Manuals 4/08 4/15 4/16          IASTM  8' 8'          Taping Applied to 1st dorsal X X                                    TE             HEP St dorsal stretch    Wrist AROM            1st dorsal stretch  5' 5'          Med ball rolls  3' 3'          Thumb/Wrist AAROM  5' 5'          Wrist Maze   5x 5x          Thumb isometrics  HEP           Power web  Y web ecc thumb flexion 2x10           Wall walking  TB 5x each                                                                                                                                                          Ther Activity             Keypegs  Transl in/opp out Transl in/opp out                                                              Modalities             MHP  5' 5'

## 2025-04-17 PROBLEM — Z01.419 ENCOUNTER FOR ANNUAL ROUTINE GYNECOLOGICAL EXAMINATION: Status: RESOLVED | Noted: 2025-03-18 | Resolved: 2025-04-17

## 2025-04-18 ENCOUNTER — APPOINTMENT (OUTPATIENT)
Dept: OCCUPATIONAL THERAPY | Facility: CLINIC | Age: 60
End: 2025-04-18
Payer: MEDICARE

## 2025-04-23 ENCOUNTER — OFFICE VISIT (OUTPATIENT)
Dept: PHYSICAL THERAPY | Facility: CLINIC | Age: 60
End: 2025-04-23
Payer: MEDICARE

## 2025-04-23 ENCOUNTER — OFFICE VISIT (OUTPATIENT)
Dept: OCCUPATIONAL THERAPY | Facility: CLINIC | Age: 60
End: 2025-04-23
Payer: MEDICARE

## 2025-04-23 DIAGNOSIS — M65.4 DE QUERVAIN'S TENOSYNOVITIS, RIGHT: Primary | ICD-10-CM

## 2025-04-23 DIAGNOSIS — G89.29 CHRONIC NECK PAIN: Primary | ICD-10-CM

## 2025-04-23 DIAGNOSIS — M54.2 CHRONIC NECK PAIN: Primary | ICD-10-CM

## 2025-04-23 PROCEDURE — 97112 NEUROMUSCULAR REEDUCATION: CPT | Performed by: PHYSICAL THERAPIST

## 2025-04-23 PROCEDURE — 97110 THERAPEUTIC EXERCISES: CPT

## 2025-04-23 PROCEDURE — 97110 THERAPEUTIC EXERCISES: CPT | Performed by: PHYSICAL THERAPIST

## 2025-04-23 PROCEDURE — 97161 PT EVAL LOW COMPLEX 20 MIN: CPT | Performed by: PHYSICAL THERAPIST

## 2025-04-23 PROCEDURE — 97140 MANUAL THERAPY 1/> REGIONS: CPT

## 2025-04-23 NOTE — PROGRESS NOTES
"Daily Note     Today's date: 2025  Patient name: Rosa Elena Ernandez  : 1965  MRN: [id number]  Referring provider: Marcus Baeza MD  Dx:   Encounter Diagnosis     ICD-10-CM    1. De Quervain's tenosynovitis, right  M65.4                      Subjective: \"The tape makes a big difference\".       Objective: See treatment diary below      Assessment: Tolerated treatment well.Improving tolerance to 1st dorsal stretch, but they are unable to wrap the thumb still. Tolerance to resistance exercise is improving.       Plan: Continue per plan of care.  Progress treatment as tolerated.       Precautions: Universal      Manuals 4/08 4/15 4/16 4/23         IASTM  8' 8' 8'         Taping Applied to 1st dorsal X X Applied                                   TE             HEP St dorsal stretch    Wrist AROM            1st dorsal stretch  5' 5' 5         Med ball rolls  3' 3' 3         Thumb/Wrist AAROM  5' 5' 5         Wrist Maze   5x 5x 5x         Thumb isometrics  HEP           Power web  Y web ecc thumb flexion 2x10  Y web 2 x10         Wall walking  TB 5x each  TB 5x each         Flex bar    Y x 20 F/E                                                                                                                                           Ther Activity             Keypegs  Transl in/opp out Transl in/opp out x1                                                             Modalities             MHP  5' 5' 5'                           "

## 2025-04-23 NOTE — PROGRESS NOTES
PT Evaluation     Today's date: 2025  Patient name: Rosa Elena Ernandez  : 1965  MRN: [id number]  Referring provider: Marcus Baeza MD  Dx:   Encounter Diagnosis     ICD-10-CM    1. Chronic neck pain  M54.2     G89.29                      Assessment  Impairments: abnormal or restricted ROM, abnormal movement, activity intolerance, impaired physical strength, lacks appropriate home exercise program, pain with function and poor posture   Symptom irritability: moderate    Assessment details: Rosa Elena Ernandez is a pleasant 59 y.o. female who presents with 3 month hx of neck pain/stiffness. Upon physical exam, upper quarter screen is negative and there are no findings indicative of radicular component to current presentation. Mechanical assessment of the cervical spine is indicative of retraction/extension directional preference. Patient notes an improvement in symptoms with end range of motion at the end of this visit. Primary movement impairment diagnosis of cervical spine mobility deficit, thoracic spine mobility deficit, retraction/extension directional preference. I reviewed exam findings and how this indicates home exercises and the prescribed POC. Patient was receptive to this discussion and demonstrates appropriate understanding.  No further referral appears necessary at this time based upon examination results. Pt. will benefit from skilled PT services to help return patient to status at [facility].             Understanding of Dx/Px/POC: good     Prognosis: good    Goals  Impairment based goals  Patient will achieve full cervical extension AROM.   Patient will achieve full cervical L ROT AROM.   Patient will report 50% reduction in VAS at worst.     Function based goals  Patient will be independent in comprehensive HEP upon discharge.  Patient will achieve goal FOTO score upon discharge.  Patient will tolerate all daily tasks without limitation from neck pain.     Plan  Patient would benefit from: skilled  "physical therapy    Planned therapy interventions: activity modification, manual therapy, motor coordination training, neuromuscular re-education, patient education, self care, therapeutic activities, therapeutic exercise, graded activity, home exercise program, graded exercise, functional ROM exercises and strengthening    Frequency: 1-2x week  Duration in weeks: 8  Plan of Care expiration date: 6/18/2025  Treatment plan discussed with: patient        Subjective      HPI: Patient reports neck stiffness/tightness that has been present for about 3 months. She reports general stiffness in all directions. Infrequent posterior R shoulder \"sharp\" pain that is not consistent with any movement or activity. Denies distal paraesthesias. She has hx of migraine type headaches; 1x every 2 weeks. This is managed by sumatriptan.    Pain: 0/10 current; 7/10 worst   Goals: Decrease pain with ROM    Objective    Posture: upper thoracic kyphosis    Upper Quarter Screen  Myotomes:  Strength WNL B/L    Dermatomes:   Sensation WNL B/L    Reflexes:   R Biceps: 2+  L Biceps: 2+  R Brachioradialis: 2+  L Brachioradialis: 2+    Neurodynamic Testing  Median Nerve: neg  Ulnar Nerve: neg    Cervical Active Range of Motion  Movement Loss Symptoms Tay Mod Min Nil Symptoms   Flexion    x No Effect     Extension  x   Produce  No Worse; poor lower cervical ext   Protrusion    x No Effect     Retraction   x  Produce  No Worse   R Rotation   x  Produce  No Worse   L Rotation  x   Produce  No Worse     R Sidebend  x   Produce  No Worse   L Sidebend   x  Produce  No Worse   Other     ERP throughout         Sharda Assessment  Repeated Retraction in Sitting (RET): Decrease; Better  Repeated Retraction Extension in Sitting (RET EXT): Decrease; Better      Comparable sign:   L ROT  Extension  R SB           Pertinent Findings:      POC End Date: 6/18/25                                                                                          Test / " Measure  4/23/2025     FOTO (Predicted nv) nv   Cervical ext AROM Mod limitation   Cervical L ROT AROM Mod limitation   VAS at worst 6/10       Precautions: osteoporosis; hx of lumbar laminectomy      Manuals 4/23                                                                Neuro Re-Ed             Scap RET w/ ER             Row             Shoulder ext                                                    Education HEP and POC            Ther Ex             Rep RET 10x; HEP            RET + EXT 10x; HEP            Cervical EXT SNAG 10x; HEP            Seated thoracic ext 10x; HEP            L ROT SNAG                                                                 Ther Activity                                       Gait Training                                       Modalities

## 2025-04-24 ENCOUNTER — APPOINTMENT (OUTPATIENT)
Dept: OCCUPATIONAL THERAPY | Facility: CLINIC | Age: 60
End: 2025-04-24
Payer: MEDICARE

## 2025-04-28 ENCOUNTER — OFFICE VISIT (OUTPATIENT)
Dept: OCCUPATIONAL THERAPY | Facility: CLINIC | Age: 60
End: 2025-04-28
Payer: MEDICARE

## 2025-04-28 DIAGNOSIS — M65.4 DE QUERVAIN'S TENOSYNOVITIS, RIGHT: Primary | ICD-10-CM

## 2025-04-28 PROCEDURE — 97110 THERAPEUTIC EXERCISES: CPT

## 2025-04-28 PROCEDURE — 97530 THERAPEUTIC ACTIVITIES: CPT

## 2025-04-28 PROCEDURE — 97140 MANUAL THERAPY 1/> REGIONS: CPT

## 2025-04-28 NOTE — PROGRESS NOTES
Daily Note     Today's date: 2025  Patient name: Rosa Elena Ernandez  : 1965  MRN: [id number]  Referring provider: Marcus Baeza MD  Dx:   Encounter Diagnosis     ICD-10-CM    1. De Quervain's tenosynovitis, right  M65.4                      Subjective: I've been using a lidocaine patch and the brace at night and that seems to help.      Objective: See treatment diary below      Assessment: Tolerated treatment well. Mild tenderness/discomfort 1st DC with IATM and gentle stretching. KT helps control pain during the day with functional hand use. Began light resistive ex-good tolerance.      Plan: Continue per plan of care.  Progress treatment as tolerated.       Precautions: Universal      Manuals 4/08 4/15 4/16 4/23 4/28        IASTM  8' 8' 8' 8'        Taping Applied to 1st dorsal X X Applied X                                  TE             HEP St dorsal stretch    Wrist AROM            1st dorsal stretch  5' 5' 5 5'        Med ball rolls  3' 3' 3         Thumb/Wrist AAROM  5' 5' 5         Wrist Maze   5x 5x 5x 5x        Thumb isometrics  HEP           Power web  Y web ecc thumb flexion 2x10  Y web 2 x10 Y web 2x10        Wall walking  TB 5x each  TB 5x each TB 5x each        Flex bar    Y x 20 F/E Y 20x F/E        digiflex     Y isol 10x  R comp 20x                                                                                                                             Ther Activity             Keypegs  Transl in/opp out Transl in/opp out x1 1x        Velcro board     1x                                               Modalities             MHP  5' 5' 5' 5'

## 2025-04-30 ENCOUNTER — OFFICE VISIT (OUTPATIENT)
Dept: FAMILY MEDICINE CLINIC | Facility: CLINIC | Age: 60
End: 2025-04-30
Payer: MEDICARE

## 2025-04-30 VITALS
WEIGHT: 157 LBS | OXYGEN SATURATION: 99 % | SYSTOLIC BLOOD PRESSURE: 92 MMHG | HEART RATE: 70 BPM | HEIGHT: 67 IN | TEMPERATURE: 98 F | BODY MASS INDEX: 24.64 KG/M2 | DIASTOLIC BLOOD PRESSURE: 60 MMHG

## 2025-04-30 DIAGNOSIS — M81.0 AGE-RELATED OSTEOPOROSIS WITHOUT CURRENT PATHOLOGICAL FRACTURE: ICD-10-CM

## 2025-04-30 DIAGNOSIS — R00.2 PALPITATIONS: Primary | ICD-10-CM

## 2025-04-30 DIAGNOSIS — Z11.4 SCREENING FOR HIV (HUMAN IMMUNODEFICIENCY VIRUS): ICD-10-CM

## 2025-04-30 DIAGNOSIS — K50.00 CROHN'S DISEASE INVOLVING TERMINAL ILEUM (HCC): ICD-10-CM

## 2025-04-30 DIAGNOSIS — E05.90 HYPERTHYROIDISM: ICD-10-CM

## 2025-04-30 PROBLEM — K52.9 INFLAMMATORY BOWEL DISEASE: Status: RESOLVED | Noted: 2024-03-12 | Resolved: 2025-04-30

## 2025-04-30 PROCEDURE — 99204 OFFICE O/P NEW MOD 45 MIN: CPT | Performed by: FAMILY MEDICINE

## 2025-04-30 RX ORDER — RISANKIZUMAB-RZAA 180 MG/1.2
KIT SUBCUTANEOUS
COMMUNITY
Start: 2025-02-28

## 2025-04-30 NOTE — PROGRESS NOTES
Name: Rosa Elena Ernandez      : 1965      MRN: [id number]  Encounter Provider: Shaylee Miranda DO  Encounter Date: 2025   Encounter department: [facility]  :  Assessment & Plan  Palpitations  EKG shows sinus bradycardia at 59 bpm.  Nml axis.  Nml EKG  Check holter and echo    Orders:    Holter monitor; Future    Echo complete w/ contrast if indicated; Future    POCT ECG    Crohn's disease involving terminal ileum (HCC)  Continue f/u with [facility]       Hyperthyroidism  Recent tsh nml       Age-related osteoporosis without current pathological fracture  Continue f/u with [facility] rheum       Screening for HIV (human immunodeficiency virus)               Depression Screening and Follow-up Plan: Patient was screened for depression during today's encounter. They screened negative with a PHQ-2 score of 0.        History of Present Illness   HPI  Pt presents as a new pt.  Doing okay.  Lots of stressors-- with bladder CA.  Elderly parents.      Notes she has been having periodic palps.  Occasional lightheadedness but not associated with the palps or position change or exertion.  No chest pain.    Has been getting lightheadedness.      Sees GI for chron's.  No n/v/d/c    Sees [facility] Rheum for osteoporosis.  On prolia    Hx hyperthyroid.  Recent tsh nml      Review of Systems   Constitutional:  Negative for chills, fatigue, fever and unexpected weight change.   HENT:  Negative for congestion, ear pain, hearing loss, postnasal drip, rhinorrhea, sinus pressure, sinus pain, sore throat, trouble swallowing and voice change.    Eyes:  Negative for pain, redness and visual disturbance.   Respiratory:  Negative for cough and shortness of breath.    Cardiovascular:  Positive for palpitations. Negative for chest pain and leg swelling.   Gastrointestinal:  Negative for abdominal pain, constipation, diarrhea and nausea.   Endocrine: Negative for cold intolerance, heat intolerance, polydipsia and polyuria.  "  Genitourinary:  Negative for dysuria, frequency and urgency.   Musculoskeletal:  Negative for arthralgias, joint swelling and myalgias.   Skin:  Negative for rash.        No suspicious lesions   Neurological:  Positive for light-headedness. Negative for weakness, numbness and headaches.   Hematological:  Negative for adenopathy.       Objective   BP 92/60 (Patient Position: Sitting, Cuff Size: Standard)   Pulse 70   Temp 98 °F (36.7 °C) (Temporal)   Ht 5' 7\" (1.702 m)   Wt 71.2 kg (157 lb)   LMP 02/01/2018   SpO2 99%   BMI 24.59 kg/m²      Physical Exam  Constitutional:       Appearance: Normal appearance.   HENT:      Head: Normocephalic and atraumatic.      Right Ear: Tympanic membrane, ear canal and external ear normal.      Left Ear: Tympanic membrane, ear canal and external ear normal.      Nose: Nose normal. No congestion.      Mouth/Throat:      Mouth: Mucous membranes are moist.      Pharynx: No oropharyngeal exudate or posterior oropharyngeal erythema.   Eyes:      Extraocular Movements: Extraocular movements intact.      Conjunctiva/sclera: Conjunctivae normal.      Pupils: Pupils are equal, round, and reactive to light.   Neck:      Vascular: No carotid bruit.   Cardiovascular:      Rate and Rhythm: Normal rate and regular rhythm.      Heart sounds: No murmur heard.     No friction rub. No gallop.   Pulmonary:      Effort: Pulmonary effort is normal.      Breath sounds: No wheezing, rhonchi or rales.   Abdominal:      General: Abdomen is flat. There is no distension.      Palpations: Abdomen is soft.      Tenderness: There is no abdominal tenderness.   Musculoskeletal:      Cervical back: Neck supple.   Lymphadenopathy:      Cervical: No cervical adenopathy.   Neurological:      General: No focal deficit present.      Mental Status: She is alert and oriented to person, place, and time.      Cranial Nerves: No cranial nerve deficit.      Motor: No weakness.      Deep Tendon Reflexes: Reflexes " normal.

## 2025-05-01 ENCOUNTER — OFFICE VISIT (OUTPATIENT)
Dept: OCCUPATIONAL THERAPY | Facility: CLINIC | Age: 60
End: 2025-05-01
Payer: MEDICARE

## 2025-05-01 ENCOUNTER — OFFICE VISIT (OUTPATIENT)
Dept: PHYSICAL THERAPY | Facility: CLINIC | Age: 60
End: 2025-05-01
Payer: MEDICARE

## 2025-05-01 DIAGNOSIS — M65.4 DE QUERVAIN'S TENOSYNOVITIS, RIGHT: Primary | ICD-10-CM

## 2025-05-01 DIAGNOSIS — G89.29 CHRONIC NECK PAIN: Primary | ICD-10-CM

## 2025-05-01 DIAGNOSIS — M54.2 CHRONIC NECK PAIN: Primary | ICD-10-CM

## 2025-05-01 PROCEDURE — 97110 THERAPEUTIC EXERCISES: CPT | Performed by: PHYSICAL THERAPIST

## 2025-05-01 PROCEDURE — 97140 MANUAL THERAPY 1/> REGIONS: CPT

## 2025-05-01 PROCEDURE — 97112 NEUROMUSCULAR REEDUCATION: CPT | Performed by: PHYSICAL THERAPIST

## 2025-05-01 PROCEDURE — 97110 THERAPEUTIC EXERCISES: CPT

## 2025-05-01 NOTE — PROGRESS NOTES
Daily Note     Today's date: 2025  Patient name: Rosa Elena Ernandez  : 1965  MRN: [id number]  Referring provider: Marcus Baeza MD  Dx:   Encounter Diagnosis     ICD-10-CM    1. Chronic neck pain  M54.2     G89.29                      Subjective: Patient reports some improvement in stiffness since last visit. She reports performing exercises 1-2x per day.       Objective: See treatment diary below      Assessment: Patient tolerates tx well. She continues to demonstrate positive response to HEP from last visit. She demonstrates improvement in cervical ROT and SB AROM at the end of tx. I advised her to increase frequency of performance for better carry over. I plan to follow up in 1 week to assess her response to 2-3 x per day frequency.       Plan: Continue per plan of care.      Pertinent Findings:      POC End Date: 25                                                                                          Test / Measure  2025     FOTO (Predicted nv) nv   Cervical ext AROM Mod limitation   Cervical L ROT AROM Mod limitation   VAS at worst 6/10       Precautions: osteoporosis; hx of lumbar laminectomy      Manuals                                                                Neuro Re-Ed             Scap RET w/ ER             Row             Shoulder ext                                                    Education HEP and POC HEP discussion           Ther Ex             Rep RET 10x; HEP            RET + EXT 10x; HEP 10x           Cervical EXT SNAG 10x; HEP 10x           Seated thoracic ext 10x; HEP 10x           L ROT SNAG  10x                                                               Ther Activity                                       Gait Training                                       Modalities

## 2025-05-01 NOTE — PROGRESS NOTES
Daily Note     Today's date: 2025  Patient name: Rosa Elena Ernandez  : 1965  MRN: [id number]  Referring provider: Marcus Baeza MD  Dx:   Encounter Diagnosis     ICD-10-CM    1. De Quervain's tenosynovitis, right  M65.4                      Subjective: It's feeling pretty good actually.      Objective: See treatment diary below      Assessment: Tolerated treatment well. Pt having less pain with functional hand use. Was able to progress some strengthening ex-good tolerance.      Plan: Continue per plan of care.  Progress treatment as tolerated.       Precautions: Universal      Manuals 4/08 4/15 4/16 4/23 4/28 5/1       IASTM  8' 8' 8' 8' 8'       Taping Applied to 1st dorsal X X Applied X X                                 TE             HEP St dorsal stretch    Wrist AROM            1st dorsal stretch  5' 5' 5 5' 5'       Med ball rolls  3' 3' 3         Thumb/Wrist AAROM  5' 5' 5         Wrist Maze   5x 5x 5x 5x 5x       Thumb isometrics  HEP           Power web  Y web ecc thumb flexion 2x10  Y web 2 x10 Y web 2x10 Y web 2x10       Wall walking  TB 5x each  TB 5x each TB 5x each TB 5x each       Flex bar    Y x 20 F/E Y 20x F/E R 20x F/E       digiflex     Y isol 10x  R comp 20x R isol 10x  G comp 20x       Power web      R center and rim 20x    RR clips 1x                                                                                                               Ther Activity             Keypegs  Transl in/opp out Transl in/opp out x1 1x 1x       Velcro board     1x                                               Modalities             MHP  5' 5' 5' 5' 5'

## 2025-05-07 ENCOUNTER — OFFICE VISIT (OUTPATIENT)
Dept: OCCUPATIONAL THERAPY | Facility: CLINIC | Age: 60
End: 2025-05-07
Payer: MEDICARE

## 2025-05-07 ENCOUNTER — OFFICE VISIT (OUTPATIENT)
Dept: PHYSICAL THERAPY | Facility: CLINIC | Age: 60
End: 2025-05-07
Payer: MEDICARE

## 2025-05-07 DIAGNOSIS — G89.29 CHRONIC NECK PAIN: Primary | ICD-10-CM

## 2025-05-07 DIAGNOSIS — M65.4 DE QUERVAIN'S TENOSYNOVITIS, RIGHT: Primary | ICD-10-CM

## 2025-05-07 DIAGNOSIS — M54.2 CHRONIC NECK PAIN: Primary | ICD-10-CM

## 2025-05-07 PROCEDURE — 97140 MANUAL THERAPY 1/> REGIONS: CPT

## 2025-05-07 PROCEDURE — 97112 NEUROMUSCULAR REEDUCATION: CPT | Performed by: PHYSICAL THERAPIST

## 2025-05-07 PROCEDURE — 97110 THERAPEUTIC EXERCISES: CPT

## 2025-05-07 PROCEDURE — 97110 THERAPEUTIC EXERCISES: CPT | Performed by: PHYSICAL THERAPIST

## 2025-05-07 NOTE — PROGRESS NOTES
Daily Note     Today's date: 2025  Patient name: Rosa Elena Ernandez  : 1965  MRN: [id number]  Referring provider: Marcus Baeza MD  Dx:   Encounter Diagnosis     ICD-10-CM    1. De Quervain's tenosynovitis, right  M65.4                      Subjective: This is the best it has felt in awhile. Yesterday, was the closest to normal it has been.       Objective: See treatment diary below      Assessment: Tolerated treatment well. Patient reported overall improving symptoms. Tape continues to reduce symptoms.       Plan: Continue per plan of care.  Progress treatment as tolerated.       Precautions: Universal      Manuals 4/08 4/15 4/16 4/23 4/28 5/1 5/7      IASTM  8' 8' 8' 8' 8' 8      Taping Applied to 1st dorsal X X Applied X X X                                TE             HEP St dorsal stretch    Wrist AROM            1st dorsal stretch  5' 5' 5 5' 5' 5'      Med ball rolls  3' 3' 3         Thumb/Wrist AAROM  5' 5' 5         Wrist Maze   5x 5x 5x 5x 5x 5x      Thumb isometrics  HEP           Power web  Y web ecc thumb flexion 2x10  Y web 2 x10 Y web 2x10 Y web 2x10 Y web 2x10      Wall walking  TB 5x each  TB 5x each TB 5x each TB 5x each TB 5x each      Flex bar    Y x 20 F/E Y 20x F/E R 20x F/E R 20x F/E      digiflex     Y isol 10x  R comp 20x R isol 10x  G comp 20x R isol x10    G comp x20      Power web      R center and rim 20x    RR clips 1x R center and rim 20x    RR x1                                                                                                              Ther Activity             Keypegs  Transl in/opp out Transl in/opp out x1 1x 1x 1x      Velcro board     1x                                               Modalities             MHP  5' 5' 5' 5' 5' 5'

## 2025-05-07 NOTE — PROGRESS NOTES
Daily Note     Today's date: 2025  Patient name: Rosa Elena Ernandez  : 1965  MRN: [id number]  Referring provider: Marcus Baeza MD  Dx:   Encounter Diagnosis     ICD-10-CM    1. Chronic neck pain  M54.2     G89.29                      Subjective: Patient reports that she improves with HEP; wakes up each day with the same level of pain/stiffness/soreness.       Objective: See treatment diary below      Assessment: Patient continues to demonstrate positive response to RET + EXT. She compensates with scapular elevation during the movement. Cueing to exhale and depress the scapula was helpful to limit this compensation. I advised patient to perform RET + EXT more frequently during the day, up to 6x per day with an effort to prevent scapular elevation during the movement. Patient was receptive to discussion and demonstrates appropriate understanding. Patient may follow up as needed in the future.       Plan: Continue per plan of care.      Pertinent Findings:      POC End Date: 25                                                                                          Test / Measure  2025     FOTO (Predicted nv) nv   Cervical ext AROM Mod limitation   Cervical L ROT AROM Mod limitation   VAS at worst 6/10       Precautions: osteoporosis; hx of lumbar laminectomy      Manuals                                                               Neuro Re-Ed             Scap RET w/ ER             Row             Shoulder ext             Deep breathing w/ active scapular depressions   5 min                                    Education HEP and POC HEP discussion HEP discussion, and cueing for scapular depression          Ther Ex             Rep RET 10x; HEP            RET + EXT 10x; HEP 10x 20x; cueing for scapular depression          Cervical EXT SNAG 10x; HEP 10x           Seated thoracic ext 10x; HEP 10x           L ROT SNAG  10x                                                               Ther  Activity                                       Gait Training                                       Modalities

## 2025-05-08 ENCOUNTER — OFFICE VISIT (OUTPATIENT)
Dept: OCCUPATIONAL THERAPY | Facility: CLINIC | Age: 60
End: 2025-05-08
Payer: MEDICARE

## 2025-05-08 DIAGNOSIS — M65.4 DE QUERVAIN'S TENOSYNOVITIS, RIGHT: Primary | ICD-10-CM

## 2025-05-08 PROCEDURE — 97140 MANUAL THERAPY 1/> REGIONS: CPT

## 2025-05-08 PROCEDURE — 97110 THERAPEUTIC EXERCISES: CPT

## 2025-05-08 NOTE — PROGRESS NOTES
Daily Note     Today's date: 2025  Patient name: Rosa Elena Ernandez  : 1965  MRN: [id number]  Referring provider: Marcus Baeza MD  Dx:   Encounter Diagnosis     ICD-10-CM    1. De Quervain's tenosynovitis, right  M65.4                      Subjective: It's doing so much better, but I am being careful not to over-do it.       Objective: See treatment diary below      Assessment: Tolerated treatment well. Pt cont to have less pain with functional use of hand at home. Better tolerance to 1st DC stretch. KT cont to help control pain during the day, and she wears the brace at night. Progressed some ex-god tolerance.      Plan: Continue per plan of care.  Progress treatment as tolerated.       Precautions: Universal      Manuals 4/08 4/15 4/16 4/23 4/28 5/1 5/7 5/8     IASTM  8' 8' 8' 8' 8' 8 8'     Taping Applied to 1st dorsal X X Applied X X X X                               TE             HEP St dorsal stretch    Wrist AROM            1st dorsal stretch  5' 5' 5 5' 5' 5' 5'     Med ball rolls  3' 3' 3         Thumb/Wrist AAROM  5' 5' 5         Wrist Maze   5x 5x 5x 5x 5x 5x 5x     Thumb isometrics  HEP           Power web  Y web ecc thumb flexion 2x10  Y web 2 x10 Y web 2x10 Y web 2x10 Y web 2x10 Y web 2x10     Wall walking  TB 5x each  TB 5x each TB 5x each TB 5x each TB 5x each Green ball 3x     Flex bar    Y x 20 F/E Y 20x F/E R 20x F/E R 20x F/E R 20x F/E     digiflex     Y isol 10x  R comp 20x R isol 10x  G comp 20x R isol x10    G comp x20 R isol x10    G comp x20     Power web      R center and rim 20x    RR clips 1x R center and rim 20x    RR x1      Peg board        R clip in/15# out                                                                                                Ther Activity             Keypegs  Transl in/opp out Transl in/opp out x1 1x 1x 1x 1x     Velcro board     1x                                               Modalities             MHP  5' 5' 5' 5' 5' 5' 5'

## 2025-05-14 ENCOUNTER — OFFICE VISIT (OUTPATIENT)
Dept: OCCUPATIONAL THERAPY | Facility: CLINIC | Age: 60
End: 2025-05-14
Payer: MEDICARE

## 2025-05-14 ENCOUNTER — OFFICE VISIT (OUTPATIENT)
Dept: OBGYN CLINIC | Facility: CLINIC | Age: 60
End: 2025-05-14
Payer: MEDICARE

## 2025-05-14 VITALS — BODY MASS INDEX: 24.17 KG/M2 | HEIGHT: 67 IN | WEIGHT: 154 LBS

## 2025-05-14 DIAGNOSIS — M65.4 DE QUERVAIN'S TENOSYNOVITIS, RIGHT: Primary | ICD-10-CM

## 2025-05-14 PROCEDURE — 99213 OFFICE O/P EST LOW 20 MIN: CPT | Performed by: SURGERY

## 2025-05-14 PROCEDURE — 97110 THERAPEUTIC EXERCISES: CPT

## 2025-05-14 PROCEDURE — 97140 MANUAL THERAPY 1/> REGIONS: CPT

## 2025-05-14 NOTE — PROGRESS NOTES
ASSESSMENT/PLAN:      Assessment & Plan  De Quervain's tenosynovitis, right  Overall, she is doing much better than where she was with the conservative care of the thumb spica, OT.   Give it another 6 weeks and still at 65% of relief, then consider another cortisone injection. Today she deferred.   We will follow up in 6 weeks, unless she feels that it has resolved and/or livable, she can cancel.   We will follow up in 6 weeks.   Orders:    Ambulatory Referral to PT/OT Hand Therapy; Future    Ambulatory Referral to PT/OT Hand Therapy; Future          The patient verbalized understanding of exam findings and treatment plan. We engaged in the shared decision-making process and treatment options were discussed at length with the patient. Surgical and conservative management discussed today along with risks and benefits.    Diagnoses and all orders for this visit:    De Quervain's tenosynovitis, right  -     Ambulatory Referral to PT/OT Hand Therapy; Future  -     Ambulatory Referral to PT/OT Hand Therapy; Future          Follow Up:  Return in about 6 weeks (around 6/25/2025) for right wrist.      To Do Next Visit:  Re-evaluation of current issue    ____________________________________________________________________________________________________________________________________________      CHIEF COMPLAINT:  Chief Complaint   Patient presents with    Follow-up     Patient thinks she is doing much better then her last appointment. A lot of her pain comes from specific activities        SUBJECTIVE:  Rosa Elena Ernandez is a 59 y.o. year old RHD female who presents today for a 6-week follow-up for like for right de Quervain's tenosynovitis.  At her last visit on 4/2/2025, patient had a injection for her de Quervain's along with a course of occupational therapy. She feels that the therapy has been beneficial. She had bought a thumb spica brace that she wears at night and uses tape during the day.        I have personally  reviewed all the relevant PMH, PSH, SH, FH, Medications and allergies.     PAST MEDICAL HISTORY:  Past Medical History:   Diagnosis Date    Anxiety     Crohn disease (HCC) 03/2024    Disease of thyroid gland     remission    Endometrial polyp 10/31/2018    Graves disease     Headache(784.0)     Migraines     Osteoporosis     Restless leg syndrome     Shingles        PAST SURGICAL HISTORY:  Past Surgical History:   Procedure Laterality Date    BACK SURGERY      2008    BOWEL RESECTION  04/2024    Robitcally    BREAST BIOPSY Right     benign    COLONOSCOPY      COLPOSCOPY  04/2004    POLYPECTOMY N/A 12/07/2018    Procedure: POLYPECTOMY;  Surgeon: Marycarmen Stone MD;  Location: [facility];  Service: Gynecology    ID HYSTEROSCOPY BX ENDOMETRIUM&/POLYPC W/WO D&C N/A 12/07/2018    Procedure: DILATATION AND CURETTAGE (D&C) WITH HYSTEROSCOPY  NEED myosure;  Surgeon: Marycarmen Stone MD;  Location: [facility];  Service: Gynecology       FAMILY HISTORY:  Family History   Problem Relation Age of Onset    Thyroid disease Mother     Arthritis Mother     Heart disease Mother     Hypertension Father     Prostate cancer Father     Diabetes Father     COPD Father     Heart disease Father     Other (lewy body dementia) Father     Thyroid disease Sister     Thyroid cancer Sister     Melanoma Brother     Thyroid cancer Daughter     Thyroid disease Daughter     Cancer Maternal Grandmother     Cervical cancer Paternal Grandmother     No Known Problems Paternal Grandfather     No Known Problems Maternal Aunt     No Known Problems Maternal Aunt     No Known Problems Paternal Aunt     Breast cancer Neg Hx     Colon cancer Neg Hx     Ovarian cancer Neg Hx     Uterine cancer Neg Hx        SOCIAL HISTORY:  Social History[1]    MEDICATIONS:  Current Medications[2]    ALLERGIES:  Allergies[3]    REVIEW OF SYSTEMS:  Review of Systems   Constitutional:  Negative for chills, fever and unexpected weight change.   HENT:  Negative for  hearing loss, nosebleeds and sore throat.    Eyes:  Negative for pain, redness and visual disturbance.   Respiratory:  Negative for cough, shortness of breath and wheezing.    Cardiovascular:  Negative for chest pain, palpitations and leg swelling.   Gastrointestinal:  Negative for abdominal pain and nausea.   Genitourinary:  Negative for dyspareunia, dysuria and frequency.   Skin:  Negative for rash and wound.   Neurological:  Negative for dizziness, numbness and headaches.   Psychiatric/Behavioral:  Negative for decreased concentration and suicidal ideas. The patient is not nervous/anxious.        VITALS:  There were no vitals filed for this visit.      _____________________________________________________  PHYSICAL EXAMINATION:  General: well developed and well nourished, alert, oriented times 3, and appears comfortable  Psychiatric: Normal  HEENT: Normocephalic, Atraumatic Trachea Midline, No torticollis  Pulmonary: No audible wheezing or respiratory distress   Cardiovascular: No pitting edema, 2+ radial pulse   Abdominal/GI: abdomen non tender, non distended   Skin: No masses, erythema, lacerations, fluctation, ulcerations  Neurovascular: Sensation Intact to the Median, Ulnar, Radial Nerve, Motor Intact to the Median, Ulnar, Radial Nerve, and Pulses Intact  Musculoskeletal: Normal, except as noted in detailed exam and in HPI.      MUSCULOSKELETAL EXAMINATION:    De Quervain's Tenosynovitis Exam:  right side    Positive tender to palpation over 1st dorsal extensor compartment   Negative palpable nodule  Negative crepitus over 1st dorsal extensor compartment   Negative Finkelstein's test    Negative pain with resisted abduction of the thumb     ___________________________________________________    STUDIES REVIEWED:  No images required to be obtained or reviewed for today's visit       LABS REVIEWED:    HgA1c:   Lab Results   Component Value Date    HGBA1C 5.8 (H) 02/27/2025     BMP:   Lab Results   Component  Value Date    CALCIUM 9.5 2025    K 4.8 2025    CO2 28 2025     2025    BUN 19 2025    CREATININE 0.71 2025           PROCEDURES PERFORMED:  Procedures  No Procedures performed today    _____________________________________________________      Scribe Attestation      I,:  Gretchen Albertgins am acting as a scribe while in the presence of the attending physician.:       I,:  Marcus Baeza MD personally performed the services described in this documentation    as scribed in my presence.:                         [1]   Social History  Tobacco Use    Smoking status: Former     Current packs/day: 0.00     Average packs/day: 0.3 packs/day for 11.7 years (3.0 ttl pk-yrs)     Types: Cigarettes     Quit date:      Years since quittin.3     Passive exposure: Never    Smokeless tobacco: Never   Vaping Use    Vaping status: Never Used   Substance Use Topics    Alcohol use: No    Drug use: No   [2]   Current Outpatient Medications:     ALPRAZolam (XANAX) 0.5 mg tablet, Take 0.5 mg by mouth daily at bedtime as needed for anxiety or sleep, Disp: , Rfl:     Calcium Carbonate-Vitamin D (CALCIUM-D PO), Take by mouth, Disp: , Rfl:     Cholecalciferol (Vitamin D3 Ultra Strength) 125 MCG (5000 UT) capsule, Take 5,000 Units by mouth in the morning., Disp: , Rfl:     denosumab (PROLIA) 60 mg/mL, , Disp: , Rfl:     Risankizumab-rzaa (Skyrizi) 180 MG/1.2ML SOCT, , Disp: , Rfl:     SUMAtriptan (IMITREX) 100 mg tablet, , Disp: , Rfl:     valACYclovir (VALTREX) 1,000 mg tablet, Take 2,000 mg by mouth every 12 (twelve) hours, Disp: , Rfl:   [3]   Allergies  Allergen Reactions    Infliximab Headache, Palpitations and Shortness Of Breath

## 2025-05-14 NOTE — PROGRESS NOTES
Daily Note     Today's date: 2025  Patient name: Rosa Elena Ernandez  : 1965  MRN: [id number]  Referring provider: Marcus Baeza MD  Dx:   Encounter Diagnosis     ICD-10-CM    1. De Quervain's tenosynovitis, right  M65.4                      Subjective: I think it is about 65% better. I declined the injection because it is getting better.       Objective: See treatment diary below      Assessment: Tolerated treatment well. Patient reports 65% improvement overall. Increased resistance as able.     Plan: Continue per plan of care.  Progress treatment as tolerated.       Precautions: Universal      Manuals 4/08 4/15 4/16 4/23 4/28 5/1 5/7 5/8 5/14    IASTM  8' 8' 8' 8' 8' 8 8' 8'    Taping Applied to 1st dorsal X X Applied X X X X X                              TE             HEP St dorsal stretch    Wrist AROM            1st dorsal stretch  5' 5' 5 5' 5' 5' 5' 5'    Med ball rolls  3' 3' 3         Thumb/Wrist AAROM  5' 5' 5         Wrist Maze   5x 5x 5x 5x 5x 5x 5x 5x    Thumb isometrics  HEP           Power web  Y web ecc thumb flexion 2x10  Y web 2 x10 Y web 2x10 Y web 2x10 Y web 2x10 Y web 2x10 Y web 2x10    Wall walking  TB 5x each  TB 5x each TB 5x each TB 5x each TB 5x each Green ball 3x G ball 3x    Flex bar    Y x 20 F/E Y 20x F/E R 20x F/E R 20x F/E R 20x F/E R 20x F/E    digiflex     Y isol 10x  R comp 20x R isol 10x  G comp 20x R isol x10    G comp x20 R isol x10    G comp x20 R isol x10    G comp x20    Power web      R center and rim 20x    RR clips 1x R center and rim 20x    RR x1      Peg board        R clip in/15# out R clip in/15# out                                                                                               Ther Activity             Keypegs  Transl in/opp out Transl in/opp out x1 1x 1x 1x 1x 1x    Velcro board     1x                                               Modalities             MHP  5' 5' 5' 5' 5' 5' 5' 5'

## 2025-05-15 ENCOUNTER — OFFICE VISIT (OUTPATIENT)
Dept: OCCUPATIONAL THERAPY | Facility: CLINIC | Age: 60
End: 2025-05-15
Payer: MEDICARE

## 2025-05-15 DIAGNOSIS — M65.4 DE QUERVAIN'S TENOSYNOVITIS, RIGHT: Primary | ICD-10-CM

## 2025-05-15 PROCEDURE — 97140 MANUAL THERAPY 1/> REGIONS: CPT

## 2025-05-15 PROCEDURE — 97110 THERAPEUTIC EXERCISES: CPT

## 2025-05-15 NOTE — PROGRESS NOTES
Daily Note     Today's date: 5/15/2025  Patient name: Rosa Elena Ernandez  : 1965  MRN: [id number]  Referring provider: Marcus Baeza MD  Dx:   Encounter Diagnosis     ICD-10-CM    1. De Quervain's tenosynovitis, right  M65.4                      Subjective: I did not get the injection because it's getting better with therapy, tape and the splint.       Objective: See treatment diary below      Assessment: Tolerated treatment well. Pt making good progress with therapy. Progressed several resistive ex. Pt was fatigued post-tx, but able to complete without increased pain.    Plan: Continue per plan of care.  Progress treatment as tolerated.       Precautions: Universal      Manuals 4/08 4/15 4/16 4/23 4/28 5/1 5/7 5/8 5/14 5/15   IASTM  8' 8' 8' 8' 8' 8 8' 8' 8'   Taping Applied to 1st dorsal X X Applied X X X X X X                             TE             HEP St dorsal stretch    Wrist AROM            1st dorsal stretch  5' 5' 5 5' 5' 5' 5' 5' 5'   Med ball rolls  3' 3' 3         Thumb/Wrist AAROM  5' 5' 5         Wrist Maze   5x 5x 5x 5x 5x 5x 5x 5x 5x   Thumb isometrics  HEP           Power web  Y web ecc thumb flexion 2x10  Y web 2 x10 Y web 2x10 Y web 2x10 Y web 2x10 Y web 2x10 Y web 2x10 Y 2x10   Wall walking  TB 5x each  TB 5x each TB 5x each TB 5x each TB 5x each Green ball 3x G ball 3x Green ball 3x   Flex bar    Y x 20 F/E Y 20x F/E R 20x F/E R 20x F/E R 20x F/E R 20x F/E R 20x F/E   digiflex     Y isol 10x  R comp 20x R isol 10x  G comp 20x R isol x10    G comp x20 R isol x10    G comp x20 R isol x10    G comp x20 R isol x10    G comp x20   Power web      R center and rim 20x    RR clips 1x R center and rim 20x    RR x1      Peg board        R clip in/15# out R clip in/15# out R clip in/15# out                                                                                              Ther Activity             Keypegs  Transl in/opp out Transl in/opp out x1 1x 1x 1x 1x 1x 1x w/ 1 marble   Velcro  board     1x                                               Modalities             [facility]  5' 5' 5' 5' 5' 5' 5' 5' 5'

## 2025-05-21 ENCOUNTER — APPOINTMENT (OUTPATIENT)
Dept: OCCUPATIONAL THERAPY | Facility: CLINIC | Age: 60
End: 2025-05-21
Payer: MEDICARE

## 2025-05-22 ENCOUNTER — APPOINTMENT (OUTPATIENT)
Dept: OCCUPATIONAL THERAPY | Facility: CLINIC | Age: 60
End: 2025-05-22
Payer: MEDICARE

## 2025-05-27 ENCOUNTER — APPOINTMENT (OUTPATIENT)
Dept: LAB | Facility: CLINIC | Age: 60
End: 2025-05-27
Attending: FAMILY MEDICINE

## 2025-05-27 ENCOUNTER — HOSPITAL ENCOUNTER (OUTPATIENT)
Dept: NON INVASIVE DIAGNOSTICS | Facility: HOSPITAL | Age: 60
Discharge: HOME | End: 2025-05-27
Attending: FAMILY MEDICINE
Payer: MEDICARE

## 2025-05-27 VITALS
HEIGHT: 67 IN | HEART RATE: 70 BPM | BODY MASS INDEX: 24.19 KG/M2 | SYSTOLIC BLOOD PRESSURE: 92 MMHG | DIASTOLIC BLOOD PRESSURE: 60 MMHG | WEIGHT: 154.1 LBS

## 2025-05-27 DIAGNOSIS — R00.2 PALPITATIONS: ICD-10-CM

## 2025-05-27 DIAGNOSIS — Z11.4 SCREENING FOR HIV (HUMAN IMMUNODEFICIENCY VIRUS): ICD-10-CM

## 2025-05-27 LAB
AORTIC ROOT: 2.9 CM
ASCENDING AORTA: 3.2 CM
BSA FOR ECHO PROCEDURE: 1.81 M2
E WAVE DECELERATION TIME: 159 MS
E/A RATIO: 1.08
FRACTIONAL SHORTENING: 27 (ref 28–44)
HIV 1+2 AB+HIV1 P24 AG SERPL QL IA: NORMAL
INTERVENTRICULAR SEPTUM IN DIASTOLE (PARASTERNAL SHORT AXIS VIEW): 0.8 CM
INTERVENTRICULAR SEPTUM: 0.8 CM (ref 0.6–1.1)
LAAS-AP2: 16.4 CM2
LAAS-AP4: 15.6 CM2
LEFT ATRIUM SIZE: 3 CM
LEFT ATRIUM VOLUME (MOD BIPLANE): 39 ML
LEFT ATRIUM VOLUME INDEX (MOD BIPLANE): 21.5 ML/M2
LEFT INTERNAL DIMENSION IN SYSTOLE: 3 CM (ref 2.1–4)
LEFT VENTRICULAR INTERNAL DIMENSION IN DIASTOLE: 4.1 CM (ref 3.5–6)
LEFT VENTRICULAR POSTERIOR WALL IN END DIASTOLE: 0.8 CM
LEFT VENTRICULAR STROKE VOLUME: 41 ML
LV EF US.2D.A4C+ESTIMATED: 64 %
LVSV (TEICH): 41 ML
MV E'TISSUE VEL-LAT: 9 CM/S
MV E'TISSUE VEL-SEP: 9 CM/S
MV PEAK A VEL: 0.52 M/S
MV PEAK E VEL: 56 CM/S
MV STENOSIS PRESSURE HALF TIME: 46 MS
MV VALVE AREA P 1/2 METHOD: 4.78
RIGHT ATRIUM AREA SYSTOLE A4C: 14.7 CM2
RIGHT VENTRICLE ID DIMENSION: 3.6 CM
SL CV LEFT ATRIUM LENGTH A2C: 5.1 CM
SL CV LV EF: 60
SL CV PED ECHO LEFT VENTRICLE DIASTOLIC VOLUME (MOD BIPLANE) 2D: 76 ML
SL CV PED ECHO LEFT VENTRICLE SYSTOLIC VOLUME (MOD BIPLANE) 2D: 35 ML
TR MAX PG: 18 MMHG
TR PEAK VELOCITY: 2.1 M/S
TRICUSPID ANNULAR PLANE SYSTOLIC EXCURSION: 2.3 CM
TRICUSPID VALVE PEAK REGURGITATION VELOCITY: 2.14 M/S

## 2025-05-27 PROCEDURE — 93306 TTE W/DOPPLER COMPLETE: CPT

## 2025-05-27 PROCEDURE — 93306 TTE W/DOPPLER COMPLETE: CPT | Performed by: INTERNAL MEDICINE

## 2025-05-27 PROCEDURE — 87389 HIV-1 AG W/HIV-1&-2 AB AG IA: CPT

## 2025-05-27 PROCEDURE — 93226 XTRNL ECG REC<48 HR SCAN A/R: CPT

## 2025-05-27 PROCEDURE — 93225 XTRNL ECG REC<48 HRS REC: CPT

## 2025-05-27 PROCEDURE — 36415 COLL VENOUS BLD VENIPUNCTURE: CPT

## 2025-05-28 ENCOUNTER — OFFICE VISIT (OUTPATIENT)
Dept: OCCUPATIONAL THERAPY | Facility: CLINIC | Age: 60
End: 2025-05-28
Payer: MEDICARE

## 2025-05-28 ENCOUNTER — RESULTS FOLLOW-UP (OUTPATIENT)
Dept: FAMILY MEDICINE CLINIC | Facility: CLINIC | Age: 60
End: 2025-05-28

## 2025-05-28 DIAGNOSIS — M65.4 DE QUERVAIN'S TENOSYNOVITIS, RIGHT: Primary | ICD-10-CM

## 2025-05-28 PROCEDURE — 97140 MANUAL THERAPY 1/> REGIONS: CPT

## 2025-05-28 PROCEDURE — 97110 THERAPEUTIC EXERCISES: CPT

## 2025-05-28 NOTE — PROGRESS NOTES
Daily Note     Today's date: 2025  Patient name: Rosa Elena Ernandez  : 1965  MRN: [id number]  Referring provider: Marcus Baeza MD  Dx:   Encounter Diagnosis     ICD-10-CM    1. De Quervain's tenosynovitis, right  M65.4                      Subjective: It feels a lot better. I have one more appointment and I think we can be done until I see ortho.       Objective: See treatment diary below      Assessment: Tolerated treatment well. Patient symptoms have mostly resolved at this time. Anticipate discharge following next visit.     Plan: Continue per plan of care.  Progress treatment as tolerated.        Insurance ReEval POC expires Auth Status Total   Units  Start date  Expiration date PT/OT + Visit Limit? Co-Insurance Misc   Medicare  25 N.A N/A 25 N/A N/A Yes  [employer]                                                             Precautions: Universal      Manuals 5/28 4/15 4/16 4/23 4/28 5/1 5/7 5/8 5/14 5/15   IASTM 8 8' 8' 8' 8' 8' 8 8' 8' 8'   Taping  X X Applied X X X X X X                             TE             HEP             1st dorsal stretch 5' 5' 5' 5 5' 5' 5' 5' 5' 5'   Med ball rolls  3' 3' 3         Thumb/Wrist AAROM  5' 5' 5         Wrist Maze  5x 5x 5x 5x 5x 5x 5x 5x 5x 5x   Thumb isometrics  HEP           Power web Y 2x10 Y web ecc thumb flexion 2x10  Y web 2 x10 Y web 2x10 Y web 2x10 Y web 2x10 Y web 2x10 Y web 2x10 Y 2x10   Wall walking Green ball 3x TB 5x each  TB 5x each TB 5x each TB 5x each TB 5x each Green ball 3x G ball 3x Green ball 3x   Flex bar R 20x F/E   Y x 20 F/E Y 20x F/E R 20x F/E R 20x F/E R 20x F/E R 20x F/E R 20x F/E   digiflex R isol x10    G comp x20    Y isol 10x  R comp 20x R isol 10x  G comp 20x R isol x10    G comp x20 R isol x10    G comp x20 R isol x10    G comp x20 R isol x10    G comp x20   Power web      R center and rim 20x    RR clips 1x R center and rim 20x    RR x1      Peg board B clip in# out       R clip in/15# out R clip in/15# out  R clip in/15# out                                                                                              Ther Activity             Keypegs 1x w/ 1 marble Transl in/opp out Transl in/opp out x1 1x 1x 1x 1x 1x 1x w/ 1 laurie   Velcro board     1x                                               Modalities             MHP 5' 5' 5' 5' 5' 5' 5' 5' 5' 5'

## 2025-05-29 ENCOUNTER — OFFICE VISIT (OUTPATIENT)
Dept: OCCUPATIONAL THERAPY | Facility: CLINIC | Age: 60
End: 2025-05-29
Payer: MEDICARE

## 2025-05-29 DIAGNOSIS — M65.4 DE QUERVAIN'S TENOSYNOVITIS, RIGHT: Primary | ICD-10-CM

## 2025-05-29 PROCEDURE — 97140 MANUAL THERAPY 1/> REGIONS: CPT

## 2025-05-29 PROCEDURE — 97110 THERAPEUTIC EXERCISES: CPT

## 2025-05-29 NOTE — PROGRESS NOTES
Daily Note     Today's date: 2025  Patient name: Rosa Elena Ernandez  : 1965  MRN: [id number]  Referring provider: Marcus Baeza MD  Dx:   Encounter Diagnosis     ICD-10-CM    1. De Quervain's tenosynovitis, right  M65.4             Subjective: It's doing good, but it still feels tight and a little uncomfortable that way (referring to [facility]).      Objective: See treatment diary below      Assessment: Tolerated treatment well. Pt I with HEP and is doing well with functional use at home.      Plan: Continue per plan of care.  Progress treatment as tolerated.        Insurance ReEval POC expires Auth Status Total   Units  Start date  Expiration date PT/OT + Visit Limit? Co-Insurance Misc   Medicare  25 N.A N/A 25 N/A N/A Yes  [employer]                                                             Precautions: Universal      Manuals 5/28 5/29 4/16 4/23 4/28 5/1 5/7 5/8 5/14 5/15   IASTM 8 8' 8' 8' 8' 8' 8 8' 8' 8'   Taping  X X Applied X X X X X X                             TE             HEP             1st dorsal stretch 5' 5' 5' 5 5' 5' 5' 5' 5' 5'   Med ball rolls   3' 3         Thumb/Wrist AAROM   5' 5         Wrist Maze  5x 5x 5x 5x 5x 5x 5x 5x 5x 5x   Thumb isometrics             Power web Y 2x10 R web ecc thumb flexion 2x10  Y web 2 x10 Y web 2x10 Y web 2x10 Y web 2x10 Y web 2x10 Y web 2x10 Y 2x10   Wall walking Green ball 3x Green 3x each  TB 5x each TB 5x each TB 5x each TB 5x each Green ball 3x G ball 3x Green ball 3x   Flex bar R 20x F/E R 20x F/E  Y x 20 F/E Y 20x F/E R 20x F/E R 20x F/E R 20x F/E R 20x F/E R 20x F/E   digiflex R isol x10    G comp x20 R isol x10    G comp x20   Y isol 10x  R comp 20x R isol 10x  G comp 20x R isol x10    G comp x20 R isol x10    G comp x20 R isol x10    G comp x20 R isol x10    G comp x20   Power web      R center and rim 20x    RR clips 1x R center and rim 20x    RR x1      Peg board B clip in# out B clip in# out      R clip in/15# out R clip in/15#  out R clip in/15# out                                                                                              Ther Activity             Keypegs 1x w/ 1 marble Transl in/opp out Transl in/opp out x1 1x 1x 1x 1x 1x 1x w/ 1 laurie   Velcro board     1x                                               Modalities             MHP 5' 5' 5' 5' 5' 5' 5' 5' 5' 5'

## 2025-06-03 PROCEDURE — 93227 XTRNL ECG REC<48 HR R&I: CPT | Performed by: INTERNAL MEDICINE

## 2025-07-08 DIAGNOSIS — F41.1 ANXIETY STATE: Primary | ICD-10-CM

## 2025-07-08 RX ORDER — ALPRAZOLAM 0.5 MG
0.5 TABLET ORAL 2 TIMES DAILY PRN
Qty: 60 TABLET | Refills: 0 | Status: SHIPPED | OUTPATIENT
Start: 2025-07-08

## 2025-07-08 NOTE — TELEPHONE ENCOUNTER
Reason for call:   [] Refill   [] Prior Auth  [x] Other: Prescribed by previous provider. Per patient she takes 2 tabs dai8ly    Office:   [x] PCP/Provider - Shaylee Miranda,    [] Specialty/Provider -     Medication: ALPRAZolam 0.5 mg    Dose/Frequency: 1 tab bid    Quantity: 60 tabs    Pharmacy: [facility]/pharmacy #[id number] - [place] - [address].      Local Pharmacy   Does the patient have enough for 3 days?   [] Yes   [x] No - Send as HP to POD    Mail Away Pharmacy   Does the patient have enough for 10 days?   [] Yes   [] No - Send as HP to POD

## 2025-07-14 ENCOUNTER — TELEPHONE (OUTPATIENT)
Age: 60
End: 2025-07-14

## 2025-07-14 NOTE — TELEPHONE ENCOUNTER
Patient returned call, message in chart relayed.  Patient scheduled 07/17 with covering provider, unable to make earlier availability.      No

## 2025-07-14 NOTE — TELEPHONE ENCOUNTER
Patient called because lately she's been feeling very woozy and dizzy and she would like to get a full panel of lab work done to see if she is deficient in anything. Please upload orders in patients MyChart. Thank you.

## 2025-07-17 ENCOUNTER — OFFICE VISIT (OUTPATIENT)
Dept: FAMILY MEDICINE CLINIC | Facility: CLINIC | Age: 60
End: 2025-07-17
Payer: MEDICARE

## 2025-07-17 VITALS
TEMPERATURE: 99.3 F | SYSTOLIC BLOOD PRESSURE: 90 MMHG | BODY MASS INDEX: 24.96 KG/M2 | HEART RATE: 75 BPM | HEIGHT: 67 IN | DIASTOLIC BLOOD PRESSURE: 58 MMHG | RESPIRATION RATE: 16 BRPM | WEIGHT: 159 LBS | OXYGEN SATURATION: 98 %

## 2025-07-17 DIAGNOSIS — R42 VERTIGO: ICD-10-CM

## 2025-07-17 DIAGNOSIS — L65.9 HAIR THINNING: Primary | ICD-10-CM

## 2025-07-17 LAB
BASOPHILS # BLD AUTO: 0.1 THOU/CMM (ref 0–0.1)
BASOPHILS NFR BLD AUTO: 1 %
DIFFERENTIAL METHOD BLD: NORMAL
EOSINOPHIL # BLD AUTO: 0.1 THOU/CMM (ref 0–0.5)
EOSINOPHIL NFR BLD AUTO: 2 %
ERYTHROCYTE [DISTWIDTH] IN BLOOD BY AUTOMATED COUNT: 12.7 % (ref 12–16)
HCT VFR BLD AUTO: 40.4 % (ref 35–43)
HGB BLD-MCNC: 13.3 G/DL (ref 11.5–14.5)
LYMPHOCYTES # BLD AUTO: 2.4 THOU/CMM (ref 1–3)
LYMPHOCYTES NFR BLD AUTO: 34 %
MCH RBC QN AUTO: 29.1 PG (ref 26–34)
MCHC RBC AUTO-ENTMCNC: 33 G/DL (ref 32–37)
MCV RBC AUTO: 88 FL (ref 80–100)
MONOCYTES # BLD AUTO: 0.7 THOU/CMM (ref 0.3–1)
MONOCYTES NFR BLD AUTO: 10 %
NEUTROPHILS # BLD AUTO: 3.8 THOU/CMM (ref 1.8–7.8)
NEUTROPHILS NFR BLD AUTO: 53 %
PLATELET # BLD AUTO: 242 THOU/CMM (ref 140–350)
PMV BLD REES-ECKER: 8.9 FL (ref 7.5–11.3)
RBC # BLD AUTO: 4.58 MILL/CMM (ref 3.7–4.7)
TSH SERPL-ACNC: 2.21 UIU/ML (ref 0.45–5.33)
WBC # BLD AUTO: 7.1 THOU/CMM (ref 4–10)

## 2025-07-17 PROCEDURE — G2211 COMPLEX E/M VISIT ADD ON: HCPCS | Performed by: FAMILY MEDICINE

## 2025-07-17 PROCEDURE — 99214 OFFICE O/P EST MOD 30 MIN: CPT | Performed by: FAMILY MEDICINE

## 2025-07-17 RX ORDER — MECLIZINE HYDROCHLORIDE 25 MG/1
25 TABLET ORAL EVERY 6 HOURS PRN
Qty: 20 TABLET | Refills: 0 | Status: SHIPPED | OUTPATIENT
Start: 2025-07-17

## 2025-07-17 NOTE — PROGRESS NOTES
Assessment & Plan  Hair thinning  CBC, TSH    Orders:  •  CBC and differential  •  TSH, 3rd generation with Free T4 reflex    Vertigo  - Symptoms suggest possible BPPV  - Referral for physical therapy to assess/treat for vertigo  - Meclizine prescribed to manage dizziness, caution with sedative SE.  Orders:  •  Ambulatory referral to Physical Therapy; Future  •  meclizine (ANTIVERT) 25 mg tablet; Take 1 tablet (25 mg total) by mouth every 6 (six) hours as needed for dizziness               Subjective:     Rosa Elena is a 60 y.o. female here today and has the below chronic conditions:    Problem List[1]  Current Outpatient Medications   Medication Sig Dispense Refill   • meclizine (ANTIVERT) 25 mg tablet Take 1 tablet (25 mg total) by mouth every 6 (six) hours as needed for dizziness 20 tablet 0   • ALPRAZolam (XANAX) 0.5 mg tablet Take 1 tablet (0.5 mg total) by mouth 2 (two) times a day as needed for anxiety or sleep 60 tablet 0   • Calcium Carbonate-Vitamin D (CALCIUM-D PO) Take by mouth     • Cholecalciferol (Vitamin D3 Ultra Strength) 125 MCG (5000 UT) capsule Take 5,000 Units by mouth in the morning.     • denosumab (PROLIA) 60 mg/mL      • Risankizumab-rzaa (Skyrizi) 180 MG/1.2ML SOCT      • SUMAtriptan (IMITREX) 100 mg tablet      • valACYclovir (VALTREX) 1,000 mg tablet Take 2,000 mg by mouth every 12 (twelve) hours       No current facility-administered medications for this visit.          Chief Complaint   Patient presents with   • Dizziness     Pt c/o intermittent dizziness for the last two months approx. Describes the feeling as lightheadedness/floaty/woozy not room spinning dizziness. Not worse with changing position. Feels like there is a lag in her vision when she turns her head or looks a different direction. Becoming more frequent. BP has been running WNL at home. Intermittent nausea, no vomiting.    • Alopecia     Pt notices more hair loss recently, states she is under a lot of stress right now.  " was just dx with cancer.      HPI:  - CC above per clinical staff and reviewed.    History of Present Illness  The patient presents for an acute visit with concerns about dizziness that has been ongoing for about 2 months.     Dizziness  - She reports experiencing dizziness, which she attributes to recent stress from a cancer diagnosis for her .  - She has been monitoring her condition closely for the past 2 months and has ruled out dehydration as a cause by increasing hydration without improvement.  - Her blood pressure readings have generally been normal at home.  - Doesn't feel lightheaded.  - She describes her episodes as feeling like her vision needs to \"catch up\" when she turns her head, similar to the sensation after being spun around.  - She mentions a sensation of her ear \"popping open\" while doing laundry, but reports no pain.  - These episodes occur frequently, even during simple tasks like turning from the refrigerator to the counter.  - The intensity of these episodes has remained consistent over the past 2 weeks- no better no worse.  - She does not experience any congestion or allergy symptoms.  - She has tried motion sickness medication without success.    Hair Loss and Hot Flashes  - She has been experiencing hair loss, which she believes could be due to stress or hormonal changes.  - She has noticed an increase in hot flashes  - Despite attempts to improve her diet and increase physical activity, she has gained weight.  - Her sleep quality has been poor for several years, often disrupted by hot flashes.  - She has a history of Graves' disease and is under the care of an endocrinologist.      - She sought physical therapy for neck pain, suspecting a pinched nerve, which improved her range of motion.      Social History:  Diet: She reports trying to eat better.  Sleep: She reports poor sleep quality, often disrupted by hot flashes.    The following portions of the patient's history were " "reviewed and updated as appropriate: allergies, current medications, past family history, past medical history, past social history, past surgical history and problem list.    ROS:  Review of Systems   As noted above.    Objective:      BP 90/58   Pulse 75   Temp 99.3 °F (37.4 °C) (Temporal)   Resp 16   Ht 5' 7\" (1.702 m)   Wt 72.1 kg (159 lb)   LMP 02/01/2018   SpO2 98%   BMI 24.90 kg/m²   BP Readings from Last 3 Encounters:   07/17/25 90/58   05/27/25 92/60   04/30/25 92/60     Wt Readings from Last 3 Encounters:   07/17/25 72.1 kg (159 lb)   05/27/25 69.9 kg (154 lb 1.6 oz)   05/14/25 69.9 kg (154 lb)               Physical Exam:   Physical Exam  Vitals and nursing note reviewed.   Constitutional:       Appearance: Normal appearance. She is not ill-appearing.     Cardiovascular:      Rate and Rhythm: Normal rate and regular rhythm.   Pulmonary:      Effort: Pulmonary effort is normal.      Breath sounds: Normal breath sounds.     Musculoskeletal:      Right lower leg: No edema.      Left lower leg: No edema.     Neurological:      Mental Status: She is alert.      Comments: Few beats of nystagmus with R sided Halpike                This office visit note was generated in part with the use of [employer] and/or voice recognition dictation.          [1]  Patient Active Problem List  Diagnosis   • Restless legs   • Postoperative pain   • Hyperthyroidism   • Osteoporosis   • Crohn's disease involving terminal ileum (HCC)   "

## 2025-07-17 NOTE — PATIENT INSTRUCTIONS
"  Patient Education     Vertigo (a type of dizziness)   The Basics   Written by the doctors and editors at [facility]   What are dizziness and vertigo? -- Dizziness is a feeling that is sometimes hard to describe. It often makes you feel like you are about to fall or pass out. Dizziness can also cause you to feel lightheaded or make it hard for you to walk straight.  Vertigo is a type of dizziness. It makes you feel like you are spinning, swaying, or tilting, or like the room is moving around you. Depending on the cause, these feelings can come and go, and might last seconds, hours, or days. You might feel worse when you move your head, change positions, cough, or sneeze.  Some people with vertigo have trouble walking. Others have nausea and might vomit.  What causes vertigo? -- The most common causes of vertigo include:   Inner ear problems - Deep inside the ear, there is a small network of tubes that are filled with fluid (figure 1). Floating inside that fluid are special calcium deposits. These tubes and deposits are part of the \"vestibular system.\" This system tells the brain what position the body is in and how, and if it is moving or still. It also helps keep you balanced.  Problems that affect the inner ear and can lead to vertigo include:   Benign paroxysmal positional vertigo (\"BPPV\") - In this condition, calcium deposits become dislodged from their location in the inner ear. This can lead to short episodes of vertigo that happen when you move your head in certain ways.   Meniere disease - This is a condition in which fluid builds up inside the inner ear. This causes vertigo, as well as hearing loss and ringing in 1 or both ears.   Vestibular neuritis - This is believed to be caused by a virus that can cause inflammation of the nerve in the inner ear. It is sometimes called \"labyrinthitis.\" People with this condition have vertigo that starts quickly and can last several days. They also often feel very sick " and off balance.   Head injury - Even a minor head injury can cause inner ear damage and vertigo. This is usually temporary.   Other problems - Other things that can cause vertigo include:   Vestibular migraine - People who get migraine headaches can sometimes have episodes of vertigo. This can happen with or without a headache.   Certain medicines   Problems that affect the brain, such as stroke or multiple sclerosis  Should I see a doctor or nurse? -- See your doctor or nurse right away if you have vertigo and:   Have a new or severe headache   Have a fever higher than 100.4°F (38°C)   Start to see double, or have trouble seeing clearly   Have trouble speaking or hearing   Have weakness in an arm or leg, or your face droops to 1 side   Cannot walk on your own   Pass out   Have numbness or tingling   Have chest pain   Cannot stop vomiting  You should also see your doctor or nurse if you have vertigo that lasts for several minutes or more and you:   Are older than 60   Had a stroke in the past   Are at risk for having a stroke, for example, because you have diabetes or you smoke  If you have dizziness or vertigo that comes and goes but you do not have any of the problems listed above, you should still make an appointment with your doctor or nurse.  Will I need tests? -- Maybe. Your doctor will start by learning about your symptoms and doing an exam. During the exam, they will check:   Your hearing   How you walk and keep your balance   How your eyes work when you watch a moving object, and when your head is turned from side to side  Depending on what your doctor finds during the exam, they might order more tests to better understand your hearing or balance problems. In some cases, the doctor will order an MRI of your brain. An MRI is an imaging test that creates pictures of the inside of your body.  How is vertigo treated? -- If your doctor knows what is causing your vertigo, they will probably try to treat that  "problem directly. For instance, if you have BPPV, the doctor might try moving your head in a specific way. This can move the calcium deposits that are causing your vertigo.  Your doctor can also give you medicines that might help your vertigo and relieve nausea and vomiting.  If your vertigo does not get better, your doctor might also suggest a treatment called \"balance rehabilitation.\" This treatment teaches you exercises that can help you cope with your vertigo.  Is there anything I can do on my own? -- Yes. You can:   Prevent falls - If you have trouble standing or walking because of vertigo, you are at risk of falling. To lower this risk, make your home as safe as possible. Get rid of loose electrical cords, clutter, and slippery rugs. Also, wear sturdy, non-slip shoes, and make sure that your walkways are clear and well lit.   Sit or lie down if you start to feel dizzy. If you start to feel dizzy while driving, pull over right away.   Use a cane or walker to help you balance if needed.   Try to avoid changing positions quickly. When you wake up, sit up first, then get out of bed slowly.  All topics are updated as new evidence becomes available and our peer review process is complete.  This topic retrieved from [employer] on: Mar 15, 2024.  Topic 69322 Version 8.0  Release: 32.2.4 - C32.73  © 2024 UpToDate, Inc. and/or its affiliates. All rights reserved.  figure 1: The vestibular system     Deep inside the ear, there is a small network of tubes that are filled with fluid, called the \"semicircular canals.\" Also deep inside the ear are special calcium deposits, called \"otolith organs.\" These tubes and deposits are part of the \"vestibular system.\" This system tells the brain what position the head is in and if it is moving or still. It also helps keep you balanced, especially when you are standing or walking.  Graphic 41388 Version 11.0  Consumer Information Use and Disclaimer   Disclaimer: This generalized " information is a limited summary of diagnosis, treatment, and/or medication information. It is not meant to be comprehensive and should be used as a tool to help the user understand and/or assess potential diagnostic and treatment options. It does NOT include all information about conditions, treatments, medications, side effects, or risks that may apply to a specific patient. It is not intended to be medical advice or a substitute for the medical advice, diagnosis, or treatment of a health care provider based on the health care provider's examination and assessment of a patient's specific and unique circumstances. Patients must speak with a health care provider for complete information about their health, medical questions, and treatment options, including any risks or benefits regarding use of medications. This information does not endorse any treatments or medications as safe, effective, or approved for treating a specific patient. UpToDate, Inc. and its affiliates disclaim any warranty or liability relating to this information or the use thereof.The use of this information is governed by the Terms of Use, available at https://www.Wikipixel.com/en/know/clinical-effectiveness-terms. 2024© UpToDate, Inc. and its affiliates and/or licensors. All rights reserved.  Copyright   © 2024 UpToDate, Inc. and/or its affiliates. All rights reserved.    Patient Education     Vestibular Exercises   About this topic   A condition called benign paroxysmal positional vertigo, or BPPV, can cause dizziness. Inside of your inner ear, you have fluid. Small crystals float in the fluid. With this problem, the crystals get stuck in the inner ear. When you move your head, you may have more signs. Sometimes, special exercises can help move the crystals and make this problem better. Always ask your doctor before you do any exercises. Your doctor or a physical therapist can help you find the best exercises for your problem.  General    Before starting with a program, ask your doctor if you are healthy enough to do these exercises. Your doctor may have you work with a  or physical therapist to make a safe exercise program to meet your needs. Some of these exercises could make your problems worse at first. Ask your doctor or physical therapist how often and how long you should do these exercises. These are often done a few times each day until 2 days after the dizziness has ended.  Epley Maneuver   This is mostly used when there is a diagnosis of a particular side having a problem.  If your right side has a problem:  Have a pillow towards the end of the bed where your feet most often are. Sit with your left side closest to the side of the bed. Sit far enough away from the pillow that it will end up being under your shoulders when you lie back. Tilt your head slightly back while doing this exercise. Be sure to hold each position for 30 seconds before moving to the next position.  Start sitting up with your legs extended in front of you. Turn your head assisted between looking straight forward and looking over your right shoulder. This is a 45 degree angle. Hold this position.  Lie back quickly with your head in the same position. Turn your head assisted to the right and tilted back a little bit. Hold this position.  Now roll your head to the left. Your head should now be tilted back a little bit. Turn your head to the left, looking assisted between straight ahead and over your left shoulder. This is a 45 degree angle. Hold this position.  Now, roll onto your left shoulder. Point your head down to the left at a 45 degree angle. Hold this position.  Finish by sitting up on the side of the bed. Do this 3 times throughout the day.  If your left side has a problem:  Have a pillow towards the end of the bed where your feet most often are. Sit with your right side closest to the side of the bed. Sit far enough away from the pillow that it will end up  being under your shoulders when you lie back. Tilt your head slightly back while doing this exercise. Be sure to hold each position for 30 seconds before moving to the next position.  Start sitting up with your legs extended in front of you. Turn your head intermediate between looking straight forward and looking over your left shoulder. This is a 45 degree angle. Hold this position.  Lie back quickly with your head in the same position. Turn your head intermediate to the left and tilted back a little bit. Hold this position.  Now, roll your head to the right. Tilt your head back a little bit. Turn your head to the right looking intermediate between straight ahead and over your right shoulder. This is a 45 degree angle. Hold this position.  Now, roll onto your right shoulder. Point your head down to the right at a 45 degree angle. Hold this position.  Finish by sitting up on the side of the bed. Do this 3 times throughout the day.  Jenkins-Darhenok Exercises   This is mostly used when there is not a diagnosis of a particular side having a problem.  Sit at the edge of the bed in the middle of one side.  Now, lie down on your right side with your head angled upwards 45 degrees. This is looking intermediate between straight ahead and over your right shoulder. Hold this position for 30 seconds or until the dizziness ends.  Sit back up with your head straight and hold that position for 30 seconds.  Now, lie down on your left side with your head angled upwards 45 degrees. This is looking intermediate between straight ahead and over your left shoulder. Hold this position for 30 seconds or until the dizziness ends.  Sit back up with your head straight and hold that position for 30 seconds. Repeat these steps 5 times in a row, 3 times each day.         What problems could happen?   Your dizziness does not go away or gets worse  You have a different kind of dizziness  You have more nausea  Sudden change in hearing  New onset of ear pain  New onset of  ringing in the ears  Fluid discharge from the ears  Last Reviewed Date   2018-03-27  Consumer Information Use and Disclaimer   This generalized information is a limited summary of diagnosis, treatment, and/or medication information. It is not meant to be comprehensive and should be used as a tool to help the user understand and/or assess potential diagnostic and treatment options. It does NOT include all information about conditions, treatments, medications, side effects, or risks that may apply to a specific patient. It is not intended to be medical advice or a substitute for the medical advice, diagnosis, or treatment of a health care provider based on the health care provider's examination and assessment of a patient’s specific and unique circumstances. Patients must speak with a health care provider for complete information about their health, medical questions, and treatment options, including any risks or benefits regarding use of medications. This information does not endorse any treatments or medications as safe, effective, or approved for treating a specific patient. UpToDate, Inc. and its affiliates disclaim any warranty or liability relating to this information or the use thereof. The use of this information is governed by the Terms of Use, available at https://www.woltersDepopuwer.com/en/know/clinical-effectiveness-terms   Copyright   Copyright © 2024 UpToDate, Inc. and its affiliates and/or licensors. All rights reserved.

## (undated) DEVICE — UNDER BUTTOCKS DRAPE W/FLUID CONTROL POUCH: Brand: CONVERTORS

## (undated) DEVICE — GLOVE PI ULTRA TOUCH SZ.6.5

## (undated) DEVICE — POV-IOD SOLUTION 4OZ BT

## (undated) DEVICE — MYOSURE SEAL SET

## (undated) DEVICE — PVC URETHRAL CATHETER: Brand: DOVER

## (undated) DEVICE — SYRINGE CATH TIP 50ML

## (undated) DEVICE — DEVICE MYOSURE TISSUE REMOVAL HYSTEROSCOPIC XLRG

## (undated) DEVICE — GLOVE INDICATOR PI UNDERGLOVE SZ 6.5 BLUE

## (undated) DEVICE — MAYO STAND COVER: Brand: CONVERTORS

## (undated) DEVICE — BETHLEHEM UNIVERSAL MINOR VAG: Brand: CARDINAL HEALTH

## (undated) DEVICE — SCD SEQUENTIAL COMPRESSION COMFORT SLEEVE MEDIUM KNEE LENGTH: Brand: KENDALL SCD

## (undated) DEVICE — 3000CC GUARDIAN II: Brand: GUARDIAN

## (undated) DEVICE — TUBE INFLOW OUTFLOW Y TUBING F/FLUID CONTROL SYSTEM AQUILEX